# Patient Record
Sex: FEMALE | Race: WHITE | NOT HISPANIC OR LATINO | URBAN - METROPOLITAN AREA
[De-identification: names, ages, dates, MRNs, and addresses within clinical notes are randomized per-mention and may not be internally consistent; named-entity substitution may affect disease eponyms.]

---

## 2017-01-01 ENCOUNTER — OUTPATIENT (OUTPATIENT)
Dept: OUTPATIENT SERVICES | Facility: HOSPITAL | Age: 62
LOS: 1 days | Discharge: HOME | End: 2017-01-01

## 2017-01-04 ENCOUNTER — APPOINTMENT (OUTPATIENT)
Dept: PSYCHIATRY | Facility: CLINIC | Age: 62
End: 2017-01-04

## 2017-01-11 ENCOUNTER — APPOINTMENT (OUTPATIENT)
Dept: PSYCHIATRY | Facility: CLINIC | Age: 62
End: 2017-01-11

## 2017-01-17 DIAGNOSIS — F34.1 DYSTHYMIC DISORDER: ICD-10-CM

## 2017-01-25 ENCOUNTER — APPOINTMENT (OUTPATIENT)
Dept: PSYCHIATRY | Facility: CLINIC | Age: 62
End: 2017-01-25

## 2017-02-01 ENCOUNTER — APPOINTMENT (OUTPATIENT)
Dept: PSYCHIATRY | Facility: CLINIC | Age: 62
End: 2017-02-01

## 2017-02-08 ENCOUNTER — APPOINTMENT (OUTPATIENT)
Dept: PSYCHIATRY | Facility: CLINIC | Age: 62
End: 2017-02-08

## 2017-02-10 ENCOUNTER — APPOINTMENT (OUTPATIENT)
Dept: PSYCHIATRY | Facility: CLINIC | Age: 62
End: 2017-02-10

## 2017-02-15 ENCOUNTER — APPOINTMENT (OUTPATIENT)
Dept: PSYCHIATRY | Facility: CLINIC | Age: 62
End: 2017-02-15

## 2017-02-22 ENCOUNTER — APPOINTMENT (OUTPATIENT)
Dept: PSYCHIATRY | Facility: CLINIC | Age: 62
End: 2017-02-22

## 2017-03-01 ENCOUNTER — APPOINTMENT (OUTPATIENT)
Dept: PSYCHIATRY | Facility: CLINIC | Age: 62
End: 2017-03-01

## 2017-03-08 ENCOUNTER — APPOINTMENT (OUTPATIENT)
Dept: PSYCHIATRY | Facility: CLINIC | Age: 62
End: 2017-03-08

## 2017-03-15 ENCOUNTER — APPOINTMENT (OUTPATIENT)
Dept: PSYCHIATRY | Facility: CLINIC | Age: 62
End: 2017-03-15

## 2017-03-22 ENCOUNTER — APPOINTMENT (OUTPATIENT)
Dept: PSYCHIATRY | Facility: CLINIC | Age: 62
End: 2017-03-22

## 2017-03-29 ENCOUNTER — APPOINTMENT (OUTPATIENT)
Dept: PSYCHIATRY | Facility: CLINIC | Age: 62
End: 2017-03-29

## 2017-04-03 ENCOUNTER — APPOINTMENT (OUTPATIENT)
Dept: PSYCHIATRY | Facility: CLINIC | Age: 62
End: 2017-04-03

## 2017-04-05 ENCOUNTER — APPOINTMENT (OUTPATIENT)
Dept: PSYCHIATRY | Facility: CLINIC | Age: 62
End: 2017-04-05

## 2017-04-12 ENCOUNTER — APPOINTMENT (OUTPATIENT)
Dept: PSYCHIATRY | Facility: CLINIC | Age: 62
End: 2017-04-12

## 2017-04-19 ENCOUNTER — APPOINTMENT (OUTPATIENT)
Dept: PSYCHIATRY | Facility: CLINIC | Age: 62
End: 2017-04-19

## 2017-04-26 ENCOUNTER — APPOINTMENT (OUTPATIENT)
Dept: PSYCHIATRY | Facility: CLINIC | Age: 62
End: 2017-04-26

## 2017-05-03 ENCOUNTER — APPOINTMENT (OUTPATIENT)
Dept: PSYCHIATRY | Facility: CLINIC | Age: 62
End: 2017-05-03

## 2017-05-10 ENCOUNTER — APPOINTMENT (OUTPATIENT)
Dept: PSYCHIATRY | Facility: CLINIC | Age: 62
End: 2017-05-10

## 2017-05-17 ENCOUNTER — APPOINTMENT (OUTPATIENT)
Dept: PSYCHIATRY | Facility: CLINIC | Age: 62
End: 2017-05-17

## 2017-05-24 ENCOUNTER — APPOINTMENT (OUTPATIENT)
Dept: PSYCHIATRY | Facility: CLINIC | Age: 62
End: 2017-05-24

## 2017-05-31 ENCOUNTER — APPOINTMENT (OUTPATIENT)
Dept: PSYCHIATRY | Facility: CLINIC | Age: 62
End: 2017-05-31

## 2017-06-07 ENCOUNTER — APPOINTMENT (OUTPATIENT)
Dept: PSYCHIATRY | Facility: CLINIC | Age: 62
End: 2017-06-07

## 2017-06-14 ENCOUNTER — APPOINTMENT (OUTPATIENT)
Dept: PSYCHIATRY | Facility: CLINIC | Age: 62
End: 2017-06-14

## 2017-06-21 ENCOUNTER — APPOINTMENT (OUTPATIENT)
Dept: PSYCHIATRY | Facility: CLINIC | Age: 62
End: 2017-06-21

## 2017-06-28 ENCOUNTER — APPOINTMENT (OUTPATIENT)
Dept: PSYCHIATRY | Facility: CLINIC | Age: 62
End: 2017-06-28

## 2017-07-01 ENCOUNTER — OUTPATIENT (OUTPATIENT)
Dept: OUTPATIENT SERVICES | Facility: HOSPITAL | Age: 62
LOS: 1 days | Discharge: HOME | End: 2017-07-01

## 2017-07-05 ENCOUNTER — APPOINTMENT (OUTPATIENT)
Dept: PSYCHIATRY | Facility: CLINIC | Age: 62
End: 2017-07-05

## 2017-07-07 DIAGNOSIS — F41.1 GENERALIZED ANXIETY DISORDER: ICD-10-CM

## 2017-07-12 ENCOUNTER — APPOINTMENT (OUTPATIENT)
Dept: PSYCHIATRY | Facility: CLINIC | Age: 62
End: 2017-07-12

## 2017-07-19 ENCOUNTER — APPOINTMENT (OUTPATIENT)
Dept: PSYCHIATRY | Facility: CLINIC | Age: 62
End: 2017-07-19

## 2017-07-26 ENCOUNTER — APPOINTMENT (OUTPATIENT)
Dept: PSYCHIATRY | Facility: CLINIC | Age: 62
End: 2017-07-26

## 2017-08-02 ENCOUNTER — APPOINTMENT (OUTPATIENT)
Dept: PSYCHIATRY | Facility: CLINIC | Age: 62
End: 2017-08-02
Payer: MEDICAID

## 2017-08-02 PROCEDURE — 90834 PSYTX W PT 45 MINUTES: CPT

## 2017-08-09 ENCOUNTER — APPOINTMENT (OUTPATIENT)
Dept: PSYCHIATRY | Facility: CLINIC | Age: 62
End: 2017-08-09
Payer: MEDICAID

## 2017-08-09 PROCEDURE — 90834 PSYTX W PT 45 MINUTES: CPT

## 2017-08-16 ENCOUNTER — APPOINTMENT (OUTPATIENT)
Dept: PSYCHIATRY | Facility: CLINIC | Age: 62
End: 2017-08-16

## 2017-08-16 ENCOUNTER — APPOINTMENT (OUTPATIENT)
Dept: PSYCHIATRY | Facility: CLINIC | Age: 62
End: 2017-08-16
Payer: MEDICAID

## 2017-08-16 PROCEDURE — 90834 PSYTX W PT 45 MINUTES: CPT

## 2017-08-23 ENCOUNTER — APPOINTMENT (OUTPATIENT)
Dept: PSYCHIATRY | Facility: CLINIC | Age: 62
End: 2017-08-23

## 2017-08-30 ENCOUNTER — APPOINTMENT (OUTPATIENT)
Dept: PSYCHIATRY | Facility: CLINIC | Age: 62
End: 2017-08-30
Payer: MEDICAID

## 2017-08-30 PROCEDURE — 90834 PSYTX W PT 45 MINUTES: CPT

## 2017-09-06 ENCOUNTER — APPOINTMENT (OUTPATIENT)
Dept: PSYCHIATRY | Facility: CLINIC | Age: 62
End: 2017-09-06
Payer: MEDICAID

## 2017-09-06 ENCOUNTER — APPOINTMENT (OUTPATIENT)
Dept: PSYCHIATRY | Facility: CLINIC | Age: 62
End: 2017-09-06

## 2017-09-06 PROCEDURE — 90834 PSYTX W PT 45 MINUTES: CPT

## 2017-09-13 ENCOUNTER — APPOINTMENT (OUTPATIENT)
Dept: PSYCHIATRY | Facility: CLINIC | Age: 62
End: 2017-09-13
Payer: MEDICAID

## 2017-09-13 ENCOUNTER — APPOINTMENT (OUTPATIENT)
Dept: PSYCHIATRY | Facility: CLINIC | Age: 62
End: 2017-09-13

## 2017-09-13 PROCEDURE — 90834 PSYTX W PT 45 MINUTES: CPT

## 2017-09-27 ENCOUNTER — APPOINTMENT (OUTPATIENT)
Dept: PSYCHIATRY | Facility: CLINIC | Age: 62
End: 2017-09-27
Payer: MEDICAID

## 2017-09-27 PROCEDURE — 90834 PSYTX W PT 45 MINUTES: CPT

## 2017-10-04 ENCOUNTER — APPOINTMENT (OUTPATIENT)
Dept: PSYCHIATRY | Facility: CLINIC | Age: 62
End: 2017-10-04
Payer: MEDICAID

## 2017-10-04 PROCEDURE — 90834 PSYTX W PT 45 MINUTES: CPT

## 2017-10-11 ENCOUNTER — APPOINTMENT (OUTPATIENT)
Dept: PSYCHIATRY | Facility: CLINIC | Age: 62
End: 2017-10-11
Payer: MEDICAID

## 2017-10-11 PROCEDURE — 90834 PSYTX W PT 45 MINUTES: CPT

## 2017-10-18 ENCOUNTER — APPOINTMENT (OUTPATIENT)
Dept: PSYCHIATRY | Facility: CLINIC | Age: 62
End: 2017-10-18
Payer: MEDICAID

## 2017-10-18 PROCEDURE — 90834 PSYTX W PT 45 MINUTES: CPT

## 2017-10-25 ENCOUNTER — APPOINTMENT (OUTPATIENT)
Dept: PSYCHIATRY | Facility: CLINIC | Age: 62
End: 2017-10-25
Payer: MEDICAID

## 2017-10-25 PROCEDURE — 90834 PSYTX W PT 45 MINUTES: CPT

## 2017-11-01 ENCOUNTER — APPOINTMENT (OUTPATIENT)
Dept: PSYCHIATRY | Facility: CLINIC | Age: 62
End: 2017-11-01
Payer: MEDICAID

## 2017-11-01 PROCEDURE — 90834 PSYTX W PT 45 MINUTES: CPT

## 2017-11-08 ENCOUNTER — APPOINTMENT (OUTPATIENT)
Dept: PSYCHIATRY | Facility: CLINIC | Age: 62
End: 2017-11-08
Payer: MEDICAID

## 2017-11-08 PROCEDURE — 90834 PSYTX W PT 45 MINUTES: CPT

## 2017-11-15 ENCOUNTER — APPOINTMENT (OUTPATIENT)
Dept: PSYCHIATRY | Facility: CLINIC | Age: 62
End: 2017-11-15
Payer: MEDICAID

## 2017-11-15 PROCEDURE — 90834 PSYTX W PT 45 MINUTES: CPT

## 2017-11-22 ENCOUNTER — APPOINTMENT (OUTPATIENT)
Dept: PSYCHIATRY | Facility: CLINIC | Age: 62
End: 2017-11-22

## 2017-11-29 ENCOUNTER — APPOINTMENT (OUTPATIENT)
Dept: PSYCHIATRY | Facility: CLINIC | Age: 62
End: 2017-11-29

## 2017-12-06 ENCOUNTER — APPOINTMENT (OUTPATIENT)
Dept: PSYCHIATRY | Facility: CLINIC | Age: 62
End: 2017-12-06
Payer: MEDICAID

## 2017-12-06 PROCEDURE — 90834 PSYTX W PT 45 MINUTES: CPT

## 2017-12-13 ENCOUNTER — APPOINTMENT (OUTPATIENT)
Dept: PSYCHIATRY | Facility: CLINIC | Age: 62
End: 2017-12-13
Payer: MEDICAID

## 2017-12-13 PROCEDURE — 90834 PSYTX W PT 45 MINUTES: CPT

## 2017-12-20 ENCOUNTER — APPOINTMENT (OUTPATIENT)
Dept: PSYCHIATRY | Facility: CLINIC | Age: 62
End: 2017-12-20
Payer: MEDICAID

## 2017-12-20 PROCEDURE — 90834 PSYTX W PT 45 MINUTES: CPT

## 2017-12-27 ENCOUNTER — APPOINTMENT (OUTPATIENT)
Dept: PSYCHIATRY | Facility: CLINIC | Age: 62
End: 2017-12-27

## 2018-01-01 ENCOUNTER — OUTPATIENT (OUTPATIENT)
Dept: OUTPATIENT SERVICES | Facility: HOSPITAL | Age: 63
LOS: 1 days | Discharge: HOME | End: 2018-01-01

## 2018-01-03 ENCOUNTER — APPOINTMENT (OUTPATIENT)
Dept: PSYCHIATRY | Facility: CLINIC | Age: 63
End: 2018-01-03
Payer: MEDICAID

## 2018-01-03 PROCEDURE — 90834 PSYTX W PT 45 MINUTES: CPT

## 2018-01-10 ENCOUNTER — APPOINTMENT (OUTPATIENT)
Dept: PSYCHIATRY | Facility: CLINIC | Age: 63
End: 2018-01-10
Payer: MEDICAID

## 2018-01-10 PROCEDURE — 90834 PSYTX W PT 45 MINUTES: CPT

## 2018-01-11 DIAGNOSIS — F41.1 GENERALIZED ANXIETY DISORDER: ICD-10-CM

## 2018-01-24 ENCOUNTER — APPOINTMENT (OUTPATIENT)
Dept: PSYCHIATRY | Facility: CLINIC | Age: 63
End: 2018-01-24

## 2018-01-31 ENCOUNTER — APPOINTMENT (OUTPATIENT)
Dept: PSYCHIATRY | Facility: CLINIC | Age: 63
End: 2018-01-31
Payer: MEDICAID

## 2018-01-31 PROCEDURE — 90834 PSYTX W PT 45 MINUTES: CPT

## 2018-02-07 ENCOUNTER — APPOINTMENT (OUTPATIENT)
Dept: PSYCHIATRY | Facility: CLINIC | Age: 63
End: 2018-02-07
Payer: MEDICAID

## 2018-02-07 PROCEDURE — 90834 PSYTX W PT 45 MINUTES: CPT

## 2018-02-14 ENCOUNTER — APPOINTMENT (OUTPATIENT)
Dept: PSYCHIATRY | Facility: CLINIC | Age: 63
End: 2018-02-14
Payer: MEDICAID

## 2018-02-14 PROCEDURE — 90834 PSYTX W PT 45 MINUTES: CPT

## 2018-02-21 ENCOUNTER — APPOINTMENT (OUTPATIENT)
Dept: PSYCHIATRY | Facility: CLINIC | Age: 63
End: 2018-02-21
Payer: MEDICAID

## 2018-02-21 PROCEDURE — 90834 PSYTX W PT 45 MINUTES: CPT

## 2018-02-28 ENCOUNTER — APPOINTMENT (OUTPATIENT)
Dept: PSYCHIATRY | Facility: CLINIC | Age: 63
End: 2018-02-28
Payer: MEDICAID

## 2018-02-28 PROCEDURE — 90834 PSYTX W PT 45 MINUTES: CPT

## 2018-03-09 ENCOUNTER — APPOINTMENT (OUTPATIENT)
Dept: PSYCHIATRY | Facility: CLINIC | Age: 63
End: 2018-03-09
Payer: MEDICAID

## 2018-03-09 PROCEDURE — 90834 PSYTX W PT 45 MINUTES: CPT

## 2018-03-14 ENCOUNTER — APPOINTMENT (OUTPATIENT)
Dept: PSYCHIATRY | Facility: CLINIC | Age: 63
End: 2018-03-14
Payer: MEDICAID

## 2018-03-14 PROCEDURE — 90834 PSYTX W PT 45 MINUTES: CPT

## 2018-03-21 ENCOUNTER — APPOINTMENT (OUTPATIENT)
Dept: PSYCHIATRY | Facility: CLINIC | Age: 63
End: 2018-03-21

## 2018-03-28 ENCOUNTER — APPOINTMENT (OUTPATIENT)
Dept: PSYCHIATRY | Facility: CLINIC | Age: 63
End: 2018-03-28

## 2018-04-04 ENCOUNTER — APPOINTMENT (OUTPATIENT)
Dept: PSYCHIATRY | Facility: CLINIC | Age: 63
End: 2018-04-04
Payer: MEDICAID

## 2018-04-04 PROCEDURE — 90834 PSYTX W PT 45 MINUTES: CPT

## 2018-04-11 ENCOUNTER — APPOINTMENT (OUTPATIENT)
Dept: PSYCHIATRY | Facility: CLINIC | Age: 63
End: 2018-04-11

## 2018-04-18 ENCOUNTER — APPOINTMENT (OUTPATIENT)
Dept: PSYCHIATRY | Facility: CLINIC | Age: 63
End: 2018-04-18
Payer: MEDICAID

## 2018-04-18 PROCEDURE — 90834 PSYTX W PT 45 MINUTES: CPT

## 2018-04-25 ENCOUNTER — APPOINTMENT (OUTPATIENT)
Dept: PSYCHIATRY | Facility: CLINIC | Age: 63
End: 2018-04-25
Payer: MEDICAID

## 2018-04-25 PROCEDURE — 90834 PSYTX W PT 45 MINUTES: CPT

## 2018-05-02 ENCOUNTER — APPOINTMENT (OUTPATIENT)
Dept: PSYCHIATRY | Facility: CLINIC | Age: 63
End: 2018-05-02

## 2018-05-09 ENCOUNTER — APPOINTMENT (OUTPATIENT)
Dept: PSYCHIATRY | Facility: CLINIC | Age: 63
End: 2018-05-09
Payer: MEDICAID

## 2018-05-09 PROCEDURE — 90834 PSYTX W PT 45 MINUTES: CPT

## 2018-05-16 ENCOUNTER — APPOINTMENT (OUTPATIENT)
Dept: PSYCHIATRY | Facility: CLINIC | Age: 63
End: 2018-05-16

## 2018-05-23 ENCOUNTER — APPOINTMENT (OUTPATIENT)
Dept: PSYCHIATRY | Facility: CLINIC | Age: 63
End: 2018-05-23

## 2018-05-30 ENCOUNTER — APPOINTMENT (OUTPATIENT)
Dept: PSYCHIATRY | Facility: CLINIC | Age: 63
End: 2018-05-30

## 2018-06-06 ENCOUNTER — APPOINTMENT (OUTPATIENT)
Dept: PSYCHIATRY | Facility: CLINIC | Age: 63
End: 2018-06-06

## 2018-06-13 ENCOUNTER — APPOINTMENT (OUTPATIENT)
Dept: PSYCHIATRY | Facility: CLINIC | Age: 63
End: 2018-06-13
Payer: MEDICAID

## 2018-06-13 PROCEDURE — 90834 PSYTX W PT 45 MINUTES: CPT

## 2018-06-18 ENCOUNTER — APPOINTMENT (OUTPATIENT)
Dept: PSYCHIATRY | Facility: CLINIC | Age: 63
End: 2018-06-18
Payer: MEDICAID

## 2018-06-18 PROCEDURE — 90834 PSYTX W PT 45 MINUTES: CPT

## 2018-06-20 ENCOUNTER — APPOINTMENT (OUTPATIENT)
Dept: PSYCHIATRY | Facility: CLINIC | Age: 63
End: 2018-06-20

## 2018-06-27 ENCOUNTER — APPOINTMENT (OUTPATIENT)
Dept: PSYCHIATRY | Facility: CLINIC | Age: 63
End: 2018-06-27
Payer: MEDICAID

## 2018-06-27 PROCEDURE — 90834 PSYTX W PT 45 MINUTES: CPT

## 2018-07-11 ENCOUNTER — APPOINTMENT (OUTPATIENT)
Dept: PSYCHIATRY | Facility: CLINIC | Age: 63
End: 2018-07-11
Payer: MEDICAID

## 2018-07-11 PROCEDURE — 90834 PSYTX W PT 45 MINUTES: CPT

## 2018-07-18 ENCOUNTER — APPOINTMENT (OUTPATIENT)
Dept: PSYCHIATRY | Facility: CLINIC | Age: 63
End: 2018-07-18

## 2018-07-25 ENCOUNTER — APPOINTMENT (OUTPATIENT)
Dept: PSYCHIATRY | Facility: CLINIC | Age: 63
End: 2018-07-25
Payer: MEDICAID

## 2018-07-25 PROCEDURE — 90834 PSYTX W PT 45 MINUTES: CPT

## 2018-08-01 ENCOUNTER — APPOINTMENT (OUTPATIENT)
Dept: PSYCHIATRY | Facility: CLINIC | Age: 63
End: 2018-08-01

## 2018-08-08 ENCOUNTER — APPOINTMENT (OUTPATIENT)
Dept: PSYCHIATRY | Facility: CLINIC | Age: 63
End: 2018-08-08
Payer: MEDICAID

## 2018-08-08 PROCEDURE — 90834 PSYTX W PT 45 MINUTES: CPT

## 2018-08-15 ENCOUNTER — APPOINTMENT (OUTPATIENT)
Dept: PSYCHIATRY | Facility: CLINIC | Age: 63
End: 2018-08-15

## 2018-08-22 ENCOUNTER — APPOINTMENT (OUTPATIENT)
Dept: PSYCHIATRY | Facility: CLINIC | Age: 63
End: 2018-08-22

## 2018-08-29 ENCOUNTER — APPOINTMENT (OUTPATIENT)
Dept: PSYCHIATRY | Facility: CLINIC | Age: 63
End: 2018-08-29
Payer: MEDICAID

## 2018-08-29 PROCEDURE — 90834 PSYTX W PT 45 MINUTES: CPT

## 2018-09-05 ENCOUNTER — APPOINTMENT (OUTPATIENT)
Dept: PSYCHIATRY | Facility: CLINIC | Age: 63
End: 2018-09-05
Payer: MEDICAID

## 2018-09-05 PROCEDURE — 90834 PSYTX W PT 45 MINUTES: CPT

## 2018-09-12 ENCOUNTER — APPOINTMENT (OUTPATIENT)
Dept: PSYCHIATRY | Facility: CLINIC | Age: 63
End: 2018-09-12

## 2018-09-19 ENCOUNTER — APPOINTMENT (OUTPATIENT)
Dept: PSYCHIATRY | Facility: CLINIC | Age: 63
End: 2018-09-19

## 2018-09-26 ENCOUNTER — APPOINTMENT (OUTPATIENT)
Dept: PSYCHIATRY | Facility: CLINIC | Age: 63
End: 2018-09-26
Payer: MEDICAID

## 2018-09-26 PROCEDURE — 90834 PSYTX W PT 45 MINUTES: CPT

## 2018-10-03 ENCOUNTER — APPOINTMENT (OUTPATIENT)
Dept: PSYCHIATRY | Facility: CLINIC | Age: 63
End: 2018-10-03
Payer: MEDICAID

## 2018-10-03 PROCEDURE — 90834 PSYTX W PT 45 MINUTES: CPT

## 2018-10-10 ENCOUNTER — APPOINTMENT (OUTPATIENT)
Dept: PSYCHIATRY | Facility: CLINIC | Age: 63
End: 2018-10-10
Payer: MEDICAID

## 2018-10-10 PROCEDURE — 90834 PSYTX W PT 45 MINUTES: CPT

## 2018-10-16 ENCOUNTER — APPOINTMENT (OUTPATIENT)
Dept: PSYCHIATRY | Facility: CLINIC | Age: 63
End: 2018-10-16

## 2018-10-17 ENCOUNTER — APPOINTMENT (OUTPATIENT)
Dept: PSYCHIATRY | Facility: CLINIC | Age: 63
End: 2018-10-17

## 2018-10-22 ENCOUNTER — APPOINTMENT (OUTPATIENT)
Dept: PSYCHIATRY | Facility: CLINIC | Age: 63
End: 2018-10-22

## 2018-10-24 ENCOUNTER — APPOINTMENT (OUTPATIENT)
Dept: PSYCHIATRY | Facility: CLINIC | Age: 63
End: 2018-10-24

## 2018-10-31 ENCOUNTER — APPOINTMENT (OUTPATIENT)
Dept: PSYCHIATRY | Facility: CLINIC | Age: 63
End: 2018-10-31

## 2018-11-05 ENCOUNTER — APPOINTMENT (OUTPATIENT)
Dept: PSYCHIATRY | Facility: CLINIC | Age: 63
End: 2018-11-05
Payer: MEDICAID

## 2018-11-05 PROCEDURE — 90834 PSYTX W PT 45 MINUTES: CPT

## 2018-11-07 ENCOUNTER — APPOINTMENT (OUTPATIENT)
Dept: PSYCHIATRY | Facility: CLINIC | Age: 63
End: 2018-11-07

## 2018-11-13 ENCOUNTER — APPOINTMENT (OUTPATIENT)
Dept: PSYCHIATRY | Facility: CLINIC | Age: 63
End: 2018-11-13

## 2018-11-14 ENCOUNTER — APPOINTMENT (OUTPATIENT)
Dept: PSYCHIATRY | Facility: CLINIC | Age: 63
End: 2018-11-14

## 2018-11-19 ENCOUNTER — APPOINTMENT (OUTPATIENT)
Dept: PSYCHIATRY | Facility: CLINIC | Age: 63
End: 2018-11-19
Payer: MEDICAID

## 2018-11-19 PROCEDURE — 90834 PSYTX W PT 45 MINUTES: CPT

## 2018-11-21 ENCOUNTER — APPOINTMENT (OUTPATIENT)
Dept: PSYCHIATRY | Facility: CLINIC | Age: 63
End: 2018-11-21

## 2018-11-28 ENCOUNTER — APPOINTMENT (OUTPATIENT)
Dept: PSYCHIATRY | Facility: CLINIC | Age: 63
End: 2018-11-28

## 2018-12-03 ENCOUNTER — APPOINTMENT (OUTPATIENT)
Dept: PSYCHIATRY | Facility: CLINIC | Age: 63
End: 2018-12-03
Payer: MEDICAID

## 2018-12-03 PROCEDURE — 90834 PSYTX W PT 45 MINUTES: CPT

## 2018-12-05 ENCOUNTER — APPOINTMENT (OUTPATIENT)
Dept: PSYCHIATRY | Facility: CLINIC | Age: 63
End: 2018-12-05

## 2018-12-11 ENCOUNTER — APPOINTMENT (OUTPATIENT)
Dept: PSYCHIATRY | Facility: CLINIC | Age: 63
End: 2018-12-11

## 2018-12-12 ENCOUNTER — APPOINTMENT (OUTPATIENT)
Dept: PSYCHIATRY | Facility: CLINIC | Age: 63
End: 2018-12-12

## 2018-12-17 ENCOUNTER — APPOINTMENT (OUTPATIENT)
Dept: PSYCHIATRY | Facility: CLINIC | Age: 63
End: 2018-12-17
Payer: MEDICAID

## 2018-12-17 PROCEDURE — 90834 PSYTX W PT 45 MINUTES: CPT

## 2018-12-19 ENCOUNTER — APPOINTMENT (OUTPATIENT)
Dept: PSYCHIATRY | Facility: CLINIC | Age: 63
End: 2018-12-19

## 2018-12-26 ENCOUNTER — APPOINTMENT (OUTPATIENT)
Dept: PSYCHIATRY | Facility: CLINIC | Age: 63
End: 2018-12-26

## 2018-12-31 ENCOUNTER — APPOINTMENT (OUTPATIENT)
Dept: PSYCHIATRY | Facility: CLINIC | Age: 63
End: 2018-12-31

## 2019-01-14 ENCOUNTER — APPOINTMENT (OUTPATIENT)
Dept: PSYCHIATRY | Facility: CLINIC | Age: 64
End: 2019-01-14
Payer: MEDICAID

## 2019-01-14 PROCEDURE — 90834 PSYTX W PT 45 MINUTES: CPT

## 2019-01-28 ENCOUNTER — APPOINTMENT (OUTPATIENT)
Dept: PSYCHIATRY | Facility: CLINIC | Age: 64
End: 2019-01-28
Payer: MEDICAID

## 2019-01-28 PROCEDURE — 90834 PSYTX W PT 45 MINUTES: CPT

## 2019-02-04 ENCOUNTER — APPOINTMENT (OUTPATIENT)
Dept: PSYCHIATRY | Facility: CLINIC | Age: 64
End: 2019-02-04

## 2019-02-05 ENCOUNTER — APPOINTMENT (OUTPATIENT)
Dept: PSYCHIATRY | Facility: CLINIC | Age: 64
End: 2019-02-05

## 2019-02-27 ENCOUNTER — APPOINTMENT (OUTPATIENT)
Dept: PSYCHIATRY | Facility: CLINIC | Age: 64
End: 2019-02-27
Payer: MEDICAID

## 2019-02-27 PROCEDURE — 90834 PSYTX W PT 45 MINUTES: CPT

## 2019-03-06 ENCOUNTER — APPOINTMENT (OUTPATIENT)
Dept: PSYCHIATRY | Facility: CLINIC | Age: 64
End: 2019-03-06
Payer: MEDICAID

## 2019-03-06 PROCEDURE — 90834 PSYTX W PT 45 MINUTES: CPT

## 2019-03-11 ENCOUNTER — APPOINTMENT (OUTPATIENT)
Dept: PSYCHIATRY | Facility: CLINIC | Age: 64
End: 2019-03-11

## 2019-03-13 ENCOUNTER — APPOINTMENT (OUTPATIENT)
Dept: PSYCHIATRY | Facility: CLINIC | Age: 64
End: 2019-03-13
Payer: MEDICAID

## 2019-03-13 PROCEDURE — 90834 PSYTX W PT 45 MINUTES: CPT

## 2019-03-20 ENCOUNTER — APPOINTMENT (OUTPATIENT)
Dept: PSYCHIATRY | Facility: CLINIC | Age: 64
End: 2019-03-20
Payer: MEDICAID

## 2019-03-20 PROCEDURE — 90834 PSYTX W PT 45 MINUTES: CPT

## 2019-03-27 ENCOUNTER — APPOINTMENT (OUTPATIENT)
Dept: PSYCHIATRY | Facility: CLINIC | Age: 64
End: 2019-03-27
Payer: MEDICAID

## 2019-03-27 PROCEDURE — 90834 PSYTX W PT 45 MINUTES: CPT

## 2019-04-02 ENCOUNTER — APPOINTMENT (OUTPATIENT)
Dept: PSYCHIATRY | Facility: CLINIC | Age: 64
End: 2019-04-02

## 2019-04-03 ENCOUNTER — APPOINTMENT (OUTPATIENT)
Dept: PSYCHIATRY | Facility: CLINIC | Age: 64
End: 2019-04-03

## 2019-04-10 ENCOUNTER — APPOINTMENT (OUTPATIENT)
Dept: PSYCHIATRY | Facility: CLINIC | Age: 64
End: 2019-04-10
Payer: MEDICAID

## 2019-04-10 PROCEDURE — 90834 PSYTX W PT 45 MINUTES: CPT

## 2019-04-17 ENCOUNTER — APPOINTMENT (OUTPATIENT)
Dept: PSYCHIATRY | Facility: CLINIC | Age: 64
End: 2019-04-17

## 2019-04-24 ENCOUNTER — APPOINTMENT (OUTPATIENT)
Dept: PSYCHIATRY | Facility: CLINIC | Age: 64
End: 2019-04-24
Payer: MEDICAID

## 2019-04-24 PROCEDURE — 90834 PSYTX W PT 45 MINUTES: CPT

## 2019-05-01 ENCOUNTER — APPOINTMENT (OUTPATIENT)
Dept: PSYCHIATRY | Facility: CLINIC | Age: 64
End: 2019-05-01
Payer: MEDICAID

## 2019-05-01 PROCEDURE — 90834 PSYTX W PT 45 MINUTES: CPT

## 2019-05-08 ENCOUNTER — APPOINTMENT (OUTPATIENT)
Dept: PSYCHIATRY | Facility: CLINIC | Age: 64
End: 2019-05-08
Payer: MEDICAID

## 2019-05-08 PROCEDURE — 90834 PSYTX W PT 45 MINUTES: CPT

## 2019-05-15 ENCOUNTER — APPOINTMENT (OUTPATIENT)
Dept: PSYCHIATRY | Facility: CLINIC | Age: 64
End: 2019-05-15
Payer: MEDICAID

## 2019-05-15 PROCEDURE — 90834 PSYTX W PT 45 MINUTES: CPT

## 2019-05-22 ENCOUNTER — APPOINTMENT (OUTPATIENT)
Dept: PSYCHIATRY | Facility: CLINIC | Age: 64
End: 2019-05-22

## 2019-05-29 ENCOUNTER — APPOINTMENT (OUTPATIENT)
Dept: PSYCHIATRY | Facility: CLINIC | Age: 64
End: 2019-05-29
Payer: MEDICAID

## 2019-05-29 PROCEDURE — 90834 PSYTX W PT 45 MINUTES: CPT

## 2019-06-05 ENCOUNTER — APPOINTMENT (OUTPATIENT)
Dept: PSYCHIATRY | Facility: CLINIC | Age: 64
End: 2019-06-05

## 2019-06-12 ENCOUNTER — APPOINTMENT (OUTPATIENT)
Dept: PSYCHIATRY | Facility: CLINIC | Age: 64
End: 2019-06-12
Payer: MEDICAID

## 2019-06-12 PROCEDURE — 90834 PSYTX W PT 45 MINUTES: CPT

## 2019-06-18 ENCOUNTER — APPOINTMENT (OUTPATIENT)
Dept: PSYCHIATRY | Facility: CLINIC | Age: 64
End: 2019-06-18

## 2019-06-19 ENCOUNTER — APPOINTMENT (OUTPATIENT)
Dept: PSYCHIATRY | Facility: CLINIC | Age: 64
End: 2019-06-19
Payer: MEDICAID

## 2019-06-19 PROCEDURE — 90834 PSYTX W PT 45 MINUTES: CPT

## 2019-06-26 ENCOUNTER — APPOINTMENT (OUTPATIENT)
Dept: PSYCHIATRY | Facility: CLINIC | Age: 64
End: 2019-06-26
Payer: MEDICAID

## 2019-06-26 PROCEDURE — 90834 PSYTX W PT 45 MINUTES: CPT

## 2019-07-03 ENCOUNTER — APPOINTMENT (OUTPATIENT)
Dept: PSYCHIATRY | Facility: CLINIC | Age: 64
End: 2019-07-03

## 2019-07-10 ENCOUNTER — APPOINTMENT (OUTPATIENT)
Dept: PSYCHIATRY | Facility: CLINIC | Age: 64
End: 2019-07-10
Payer: MEDICAID

## 2019-07-10 PROCEDURE — 90834 PSYTX W PT 45 MINUTES: CPT

## 2019-07-17 ENCOUNTER — APPOINTMENT (OUTPATIENT)
Dept: PSYCHIATRY | Facility: CLINIC | Age: 64
End: 2019-07-17
Payer: MEDICAID

## 2019-07-17 PROCEDURE — 90834 PSYTX W PT 45 MINUTES: CPT

## 2019-07-24 ENCOUNTER — APPOINTMENT (OUTPATIENT)
Dept: PSYCHIATRY | Facility: CLINIC | Age: 64
End: 2019-07-24

## 2019-07-31 ENCOUNTER — APPOINTMENT (OUTPATIENT)
Dept: PSYCHIATRY | Facility: CLINIC | Age: 64
End: 2019-07-31
Payer: MEDICAID

## 2019-07-31 PROCEDURE — 90834 PSYTX W PT 45 MINUTES: CPT

## 2019-08-07 ENCOUNTER — APPOINTMENT (OUTPATIENT)
Dept: PSYCHIATRY | Facility: CLINIC | Age: 64
End: 2019-08-07
Payer: MEDICAID

## 2019-08-07 PROCEDURE — 90834 PSYTX W PT 45 MINUTES: CPT

## 2019-08-14 ENCOUNTER — APPOINTMENT (OUTPATIENT)
Dept: PSYCHIATRY | Facility: CLINIC | Age: 64
End: 2019-08-14
Payer: MEDICAID

## 2019-08-14 PROCEDURE — 90834 PSYTX W PT 45 MINUTES: CPT

## 2019-08-21 ENCOUNTER — APPOINTMENT (OUTPATIENT)
Dept: PSYCHIATRY | Facility: CLINIC | Age: 64
End: 2019-08-21
Payer: MEDICAID

## 2019-08-21 PROCEDURE — 90834 PSYTX W PT 45 MINUTES: CPT

## 2019-08-28 ENCOUNTER — APPOINTMENT (OUTPATIENT)
Dept: PSYCHIATRY | Facility: CLINIC | Age: 64
End: 2019-08-28

## 2019-09-04 ENCOUNTER — APPOINTMENT (OUTPATIENT)
Dept: PSYCHIATRY | Facility: CLINIC | Age: 64
End: 2019-09-04

## 2019-09-11 ENCOUNTER — APPOINTMENT (OUTPATIENT)
Dept: PSYCHIATRY | Facility: CLINIC | Age: 64
End: 2019-09-11

## 2019-09-17 ENCOUNTER — APPOINTMENT (OUTPATIENT)
Dept: PSYCHIATRY | Facility: CLINIC | Age: 64
End: 2019-09-17

## 2019-09-18 ENCOUNTER — APPOINTMENT (OUTPATIENT)
Dept: PSYCHIATRY | Facility: CLINIC | Age: 64
End: 2019-09-18
Payer: MEDICAID

## 2019-09-18 PROCEDURE — 90834 PSYTX W PT 45 MINUTES: CPT

## 2019-09-24 ENCOUNTER — APPOINTMENT (OUTPATIENT)
Dept: PSYCHIATRY | Facility: CLINIC | Age: 64
End: 2019-09-24

## 2019-09-25 ENCOUNTER — APPOINTMENT (OUTPATIENT)
Dept: PSYCHIATRY | Facility: CLINIC | Age: 64
End: 2019-09-25

## 2019-10-01 ENCOUNTER — APPOINTMENT (OUTPATIENT)
Dept: PSYCHIATRY | Facility: CLINIC | Age: 64
End: 2019-10-01

## 2019-10-02 ENCOUNTER — APPOINTMENT (OUTPATIENT)
Dept: PSYCHIATRY | Facility: CLINIC | Age: 64
End: 2019-10-02

## 2019-10-08 ENCOUNTER — APPOINTMENT (OUTPATIENT)
Dept: PSYCHIATRY | Facility: CLINIC | Age: 64
End: 2019-10-08

## 2019-10-09 ENCOUNTER — APPOINTMENT (OUTPATIENT)
Dept: PSYCHIATRY | Facility: CLINIC | Age: 64
End: 2019-10-09

## 2019-10-16 ENCOUNTER — APPOINTMENT (OUTPATIENT)
Dept: PSYCHIATRY | Facility: CLINIC | Age: 64
End: 2019-10-16

## 2019-10-23 ENCOUNTER — APPOINTMENT (OUTPATIENT)
Dept: PSYCHIATRY | Facility: CLINIC | Age: 64
End: 2019-10-23

## 2019-10-30 ENCOUNTER — APPOINTMENT (OUTPATIENT)
Dept: PSYCHIATRY | Facility: CLINIC | Age: 64
End: 2019-10-30

## 2019-11-06 ENCOUNTER — APPOINTMENT (OUTPATIENT)
Dept: PSYCHIATRY | Facility: CLINIC | Age: 64
End: 2019-11-06

## 2019-11-12 ENCOUNTER — APPOINTMENT (OUTPATIENT)
Dept: PSYCHIATRY | Facility: CLINIC | Age: 64
End: 2019-11-12

## 2019-11-13 ENCOUNTER — APPOINTMENT (OUTPATIENT)
Dept: PSYCHIATRY | Facility: CLINIC | Age: 64
End: 2019-11-13

## 2019-11-20 ENCOUNTER — APPOINTMENT (OUTPATIENT)
Dept: PSYCHIATRY | Facility: CLINIC | Age: 64
End: 2019-11-20

## 2019-11-27 ENCOUNTER — APPOINTMENT (OUTPATIENT)
Dept: PSYCHIATRY | Facility: CLINIC | Age: 64
End: 2019-11-27

## 2019-12-02 ENCOUNTER — APPOINTMENT (OUTPATIENT)
Dept: PSYCHIATRY | Facility: CLINIC | Age: 64
End: 2019-12-02

## 2019-12-03 ENCOUNTER — APPOINTMENT (OUTPATIENT)
Dept: PSYCHIATRY | Facility: CLINIC | Age: 64
End: 2019-12-03

## 2019-12-04 ENCOUNTER — APPOINTMENT (OUTPATIENT)
Dept: PSYCHIATRY | Facility: CLINIC | Age: 64
End: 2019-12-04

## 2019-12-11 ENCOUNTER — APPOINTMENT (OUTPATIENT)
Dept: PSYCHIATRY | Facility: CLINIC | Age: 64
End: 2019-12-11

## 2019-12-18 ENCOUNTER — APPOINTMENT (OUTPATIENT)
Dept: PSYCHIATRY | Facility: CLINIC | Age: 64
End: 2019-12-18

## 2020-01-08 ENCOUNTER — APPOINTMENT (OUTPATIENT)
Dept: PSYCHIATRY | Facility: CLINIC | Age: 65
End: 2020-01-08

## 2020-01-15 ENCOUNTER — APPOINTMENT (OUTPATIENT)
Dept: PSYCHIATRY | Facility: CLINIC | Age: 65
End: 2020-01-15

## 2020-01-22 ENCOUNTER — APPOINTMENT (OUTPATIENT)
Dept: PSYCHIATRY | Facility: CLINIC | Age: 65
End: 2020-01-22

## 2020-01-29 ENCOUNTER — APPOINTMENT (OUTPATIENT)
Dept: PSYCHIATRY | Facility: CLINIC | Age: 65
End: 2020-01-29

## 2020-02-04 ENCOUNTER — APPOINTMENT (OUTPATIENT)
Dept: PSYCHIATRY | Facility: CLINIC | Age: 65
End: 2020-02-04

## 2020-02-05 ENCOUNTER — APPOINTMENT (OUTPATIENT)
Dept: PSYCHIATRY | Facility: CLINIC | Age: 65
End: 2020-02-05

## 2020-02-12 ENCOUNTER — APPOINTMENT (OUTPATIENT)
Dept: PSYCHIATRY | Facility: CLINIC | Age: 65
End: 2020-02-12

## 2020-02-19 ENCOUNTER — APPOINTMENT (OUTPATIENT)
Dept: PSYCHIATRY | Facility: CLINIC | Age: 65
End: 2020-02-19

## 2020-02-25 ENCOUNTER — FORM ENCOUNTER (OUTPATIENT)
Age: 65
End: 2020-02-25

## 2020-02-26 ENCOUNTER — APPOINTMENT (OUTPATIENT)
Dept: PSYCHIATRY | Facility: CLINIC | Age: 65
End: 2020-02-26

## 2020-03-03 ENCOUNTER — FORM ENCOUNTER (OUTPATIENT)
Age: 65
End: 2020-03-03

## 2020-03-04 ENCOUNTER — APPOINTMENT (OUTPATIENT)
Dept: PSYCHIATRY | Facility: CLINIC | Age: 65
End: 2020-03-04

## 2020-03-10 ENCOUNTER — FORM ENCOUNTER (OUTPATIENT)
Age: 65
End: 2020-03-10

## 2020-03-11 ENCOUNTER — APPOINTMENT (OUTPATIENT)
Dept: PSYCHIATRY | Facility: CLINIC | Age: 65
End: 2020-03-11

## 2020-03-17 ENCOUNTER — FORM ENCOUNTER (OUTPATIENT)
Age: 65
End: 2020-03-17

## 2020-03-18 ENCOUNTER — APPOINTMENT (OUTPATIENT)
Dept: PSYCHIATRY | Facility: CLINIC | Age: 65
End: 2020-03-18

## 2020-03-24 ENCOUNTER — FORM ENCOUNTER (OUTPATIENT)
Age: 65
End: 2020-03-24

## 2020-03-25 ENCOUNTER — APPOINTMENT (OUTPATIENT)
Dept: PSYCHIATRY | Facility: CLINIC | Age: 65
End: 2020-03-25

## 2020-03-31 ENCOUNTER — FORM ENCOUNTER (OUTPATIENT)
Age: 65
End: 2020-03-31

## 2020-04-01 ENCOUNTER — APPOINTMENT (OUTPATIENT)
Dept: PSYCHIATRY | Facility: CLINIC | Age: 65
End: 2020-04-01

## 2020-04-07 ENCOUNTER — FORM ENCOUNTER (OUTPATIENT)
Age: 65
End: 2020-04-07

## 2020-04-08 ENCOUNTER — APPOINTMENT (OUTPATIENT)
Dept: PSYCHIATRY | Facility: CLINIC | Age: 65
End: 2020-04-08

## 2020-04-14 ENCOUNTER — FORM ENCOUNTER (OUTPATIENT)
Age: 65
End: 2020-04-14

## 2020-04-15 ENCOUNTER — APPOINTMENT (OUTPATIENT)
Dept: PSYCHIATRY | Facility: CLINIC | Age: 65
End: 2020-04-15

## 2020-04-20 ENCOUNTER — FORM ENCOUNTER (OUTPATIENT)
Age: 65
End: 2020-04-20

## 2020-04-21 ENCOUNTER — APPOINTMENT (OUTPATIENT)
Dept: PSYCHIATRY | Facility: CLINIC | Age: 65
End: 2020-04-21

## 2020-04-21 ENCOUNTER — FORM ENCOUNTER (OUTPATIENT)
Age: 65
End: 2020-04-21

## 2020-04-22 ENCOUNTER — APPOINTMENT (OUTPATIENT)
Dept: PSYCHIATRY | Facility: CLINIC | Age: 65
End: 2020-04-22

## 2020-04-28 ENCOUNTER — FORM ENCOUNTER (OUTPATIENT)
Age: 65
End: 2020-04-28

## 2020-04-29 ENCOUNTER — APPOINTMENT (OUTPATIENT)
Dept: PSYCHIATRY | Facility: CLINIC | Age: 65
End: 2020-04-29

## 2020-05-05 ENCOUNTER — FORM ENCOUNTER (OUTPATIENT)
Age: 65
End: 2020-05-05

## 2020-05-06 ENCOUNTER — APPOINTMENT (OUTPATIENT)
Dept: PSYCHIATRY | Facility: CLINIC | Age: 65
End: 2020-05-06

## 2020-05-12 ENCOUNTER — FORM ENCOUNTER (OUTPATIENT)
Age: 65
End: 2020-05-12

## 2020-05-13 ENCOUNTER — APPOINTMENT (OUTPATIENT)
Dept: PSYCHIATRY | Facility: CLINIC | Age: 65
End: 2020-05-13

## 2020-05-19 ENCOUNTER — FORM ENCOUNTER (OUTPATIENT)
Age: 65
End: 2020-05-19

## 2020-05-20 ENCOUNTER — APPOINTMENT (OUTPATIENT)
Dept: PSYCHIATRY | Facility: CLINIC | Age: 65
End: 2020-05-20

## 2020-05-26 ENCOUNTER — FORM ENCOUNTER (OUTPATIENT)
Age: 65
End: 2020-05-26

## 2020-05-27 ENCOUNTER — APPOINTMENT (OUTPATIENT)
Dept: PSYCHIATRY | Facility: CLINIC | Age: 65
End: 2020-05-27

## 2020-06-01 ENCOUNTER — FORM ENCOUNTER (OUTPATIENT)
Age: 65
End: 2020-06-01

## 2020-06-02 ENCOUNTER — FORM ENCOUNTER (OUTPATIENT)
Age: 65
End: 2020-06-02

## 2020-06-03 ENCOUNTER — APPOINTMENT (OUTPATIENT)
Dept: PSYCHIATRY | Facility: CLINIC | Age: 65
End: 2020-06-03

## 2020-06-09 ENCOUNTER — FORM ENCOUNTER (OUTPATIENT)
Age: 65
End: 2020-06-09

## 2020-06-10 ENCOUNTER — APPOINTMENT (OUTPATIENT)
Dept: PSYCHIATRY | Facility: CLINIC | Age: 65
End: 2020-06-10

## 2020-06-16 ENCOUNTER — FORM ENCOUNTER (OUTPATIENT)
Age: 65
End: 2020-06-16

## 2020-06-17 ENCOUNTER — APPOINTMENT (OUTPATIENT)
Dept: PSYCHIATRY | Facility: CLINIC | Age: 65
End: 2020-06-17

## 2020-06-23 ENCOUNTER — FORM ENCOUNTER (OUTPATIENT)
Age: 65
End: 2020-06-23

## 2020-06-24 ENCOUNTER — APPOINTMENT (OUTPATIENT)
Dept: PSYCHIATRY | Facility: CLINIC | Age: 65
End: 2020-06-24

## 2020-07-01 ENCOUNTER — APPOINTMENT (OUTPATIENT)
Dept: PSYCHIATRY | Facility: CLINIC | Age: 65
End: 2020-07-01

## 2020-07-07 ENCOUNTER — FORM ENCOUNTER (OUTPATIENT)
Age: 65
End: 2020-07-07

## 2020-07-08 ENCOUNTER — APPOINTMENT (OUTPATIENT)
Dept: PSYCHIATRY | Facility: CLINIC | Age: 65
End: 2020-07-08

## 2020-07-14 ENCOUNTER — FORM ENCOUNTER (OUTPATIENT)
Age: 65
End: 2020-07-14

## 2020-07-15 ENCOUNTER — FORM ENCOUNTER (OUTPATIENT)
Age: 65
End: 2020-07-15

## 2020-07-15 ENCOUNTER — APPOINTMENT (OUTPATIENT)
Dept: PSYCHIATRY | Facility: CLINIC | Age: 65
End: 2020-07-15

## 2020-07-16 ENCOUNTER — APPOINTMENT (OUTPATIENT)
Dept: PSYCHIATRY | Facility: CLINIC | Age: 65
End: 2020-07-16

## 2020-07-22 ENCOUNTER — APPOINTMENT (OUTPATIENT)
Dept: PSYCHIATRY | Facility: CLINIC | Age: 65
End: 2020-07-22

## 2020-07-26 ENCOUNTER — FORM ENCOUNTER (OUTPATIENT)
Age: 65
End: 2020-07-26

## 2020-07-28 ENCOUNTER — FORM ENCOUNTER (OUTPATIENT)
Age: 65
End: 2020-07-28

## 2020-07-29 ENCOUNTER — APPOINTMENT (OUTPATIENT)
Dept: PSYCHIATRY | Facility: CLINIC | Age: 65
End: 2020-07-29

## 2020-08-04 ENCOUNTER — FORM ENCOUNTER (OUTPATIENT)
Age: 65
End: 2020-08-04

## 2020-08-05 ENCOUNTER — APPOINTMENT (OUTPATIENT)
Dept: PSYCHIATRY | Facility: CLINIC | Age: 65
End: 2020-08-05

## 2020-08-05 ENCOUNTER — FORM ENCOUNTER (OUTPATIENT)
Age: 65
End: 2020-08-05

## 2020-08-06 ENCOUNTER — APPOINTMENT (OUTPATIENT)
Dept: PSYCHIATRY | Facility: CLINIC | Age: 65
End: 2020-08-06

## 2020-08-11 ENCOUNTER — FORM ENCOUNTER (OUTPATIENT)
Age: 65
End: 2020-08-11

## 2020-08-12 ENCOUNTER — APPOINTMENT (OUTPATIENT)
Dept: PSYCHIATRY | Facility: CLINIC | Age: 65
End: 2020-08-12

## 2020-08-17 ENCOUNTER — FORM ENCOUNTER (OUTPATIENT)
Age: 65
End: 2020-08-17

## 2020-08-18 ENCOUNTER — FORM ENCOUNTER (OUTPATIENT)
Age: 65
End: 2020-08-18

## 2020-08-19 ENCOUNTER — APPOINTMENT (OUTPATIENT)
Dept: PSYCHIATRY | Facility: CLINIC | Age: 65
End: 2020-08-19

## 2020-08-26 ENCOUNTER — FORM ENCOUNTER (OUTPATIENT)
Age: 65
End: 2020-08-26

## 2020-08-26 ENCOUNTER — APPOINTMENT (OUTPATIENT)
Dept: PSYCHIATRY | Facility: CLINIC | Age: 65
End: 2020-08-26

## 2020-09-01 ENCOUNTER — FORM ENCOUNTER (OUTPATIENT)
Age: 65
End: 2020-09-01

## 2020-09-02 ENCOUNTER — APPOINTMENT (OUTPATIENT)
Dept: PSYCHIATRY | Facility: CLINIC | Age: 65
End: 2020-09-02

## 2020-09-02 ENCOUNTER — FORM ENCOUNTER (OUTPATIENT)
Age: 65
End: 2020-09-02

## 2020-09-03 ENCOUNTER — APPOINTMENT (OUTPATIENT)
Dept: PSYCHIATRY | Facility: CLINIC | Age: 65
End: 2020-09-03

## 2020-09-08 ENCOUNTER — FORM ENCOUNTER (OUTPATIENT)
Age: 65
End: 2020-09-08

## 2020-09-09 ENCOUNTER — APPOINTMENT (OUTPATIENT)
Dept: PSYCHIATRY | Facility: CLINIC | Age: 65
End: 2020-09-09

## 2020-09-15 ENCOUNTER — FORM ENCOUNTER (OUTPATIENT)
Age: 65
End: 2020-09-15

## 2020-09-16 ENCOUNTER — APPOINTMENT (OUTPATIENT)
Dept: PSYCHIATRY | Facility: CLINIC | Age: 65
End: 2020-09-16

## 2020-09-22 ENCOUNTER — FORM ENCOUNTER (OUTPATIENT)
Age: 65
End: 2020-09-22

## 2020-09-23 ENCOUNTER — APPOINTMENT (OUTPATIENT)
Dept: PSYCHIATRY | Facility: CLINIC | Age: 65
End: 2020-09-23

## 2020-09-29 ENCOUNTER — FORM ENCOUNTER (OUTPATIENT)
Age: 65
End: 2020-09-29

## 2020-09-30 ENCOUNTER — FORM ENCOUNTER (OUTPATIENT)
Age: 65
End: 2020-09-30

## 2020-09-30 ENCOUNTER — APPOINTMENT (OUTPATIENT)
Dept: PSYCHIATRY | Facility: CLINIC | Age: 65
End: 2020-09-30

## 2020-10-01 ENCOUNTER — APPOINTMENT (OUTPATIENT)
Dept: PSYCHIATRY | Facility: CLINIC | Age: 65
End: 2020-10-01

## 2020-10-06 ENCOUNTER — FORM ENCOUNTER (OUTPATIENT)
Age: 65
End: 2020-10-06

## 2020-10-07 ENCOUNTER — APPOINTMENT (OUTPATIENT)
Dept: PSYCHIATRY | Facility: CLINIC | Age: 65
End: 2020-10-07

## 2020-10-13 ENCOUNTER — FORM ENCOUNTER (OUTPATIENT)
Age: 65
End: 2020-10-13

## 2020-10-14 ENCOUNTER — APPOINTMENT (OUTPATIENT)
Dept: PSYCHIATRY | Facility: CLINIC | Age: 65
End: 2020-10-14

## 2020-10-20 ENCOUNTER — FORM ENCOUNTER (OUTPATIENT)
Age: 65
End: 2020-10-20

## 2020-10-21 ENCOUNTER — APPOINTMENT (OUTPATIENT)
Dept: PSYCHIATRY | Facility: CLINIC | Age: 65
End: 2020-10-21

## 2020-10-27 ENCOUNTER — FORM ENCOUNTER (OUTPATIENT)
Age: 65
End: 2020-10-27

## 2020-10-28 ENCOUNTER — APPOINTMENT (OUTPATIENT)
Dept: PSYCHIATRY | Facility: CLINIC | Age: 65
End: 2020-10-28

## 2020-11-03 ENCOUNTER — FORM ENCOUNTER (OUTPATIENT)
Age: 65
End: 2020-11-03

## 2020-11-04 ENCOUNTER — APPOINTMENT (OUTPATIENT)
Dept: PSYCHIATRY | Facility: CLINIC | Age: 65
End: 2020-11-04

## 2020-11-04 ENCOUNTER — FORM ENCOUNTER (OUTPATIENT)
Age: 65
End: 2020-11-04

## 2020-11-05 ENCOUNTER — APPOINTMENT (OUTPATIENT)
Dept: PSYCHIATRY | Facility: CLINIC | Age: 65
End: 2020-11-05

## 2020-11-10 ENCOUNTER — FORM ENCOUNTER (OUTPATIENT)
Age: 65
End: 2020-11-10

## 2020-11-11 ENCOUNTER — APPOINTMENT (OUTPATIENT)
Dept: PSYCHIATRY | Facility: CLINIC | Age: 65
End: 2020-11-11

## 2020-11-18 ENCOUNTER — APPOINTMENT (OUTPATIENT)
Dept: PSYCHIATRY | Facility: CLINIC | Age: 65
End: 2020-11-18

## 2020-11-19 ENCOUNTER — FORM ENCOUNTER (OUTPATIENT)
Age: 65
End: 2020-11-19

## 2020-11-20 ENCOUNTER — APPOINTMENT (OUTPATIENT)
Dept: PSYCHIATRY | Facility: CLINIC | Age: 65
End: 2020-11-20

## 2020-11-25 ENCOUNTER — APPOINTMENT (OUTPATIENT)
Dept: PSYCHIATRY | Facility: CLINIC | Age: 65
End: 2020-11-25

## 2020-11-27 ENCOUNTER — APPOINTMENT (OUTPATIENT)
Dept: PSYCHIATRY | Facility: CLINIC | Age: 65
End: 2020-11-27

## 2020-12-01 ENCOUNTER — FORM ENCOUNTER (OUTPATIENT)
Age: 65
End: 2020-12-01

## 2020-12-02 ENCOUNTER — APPOINTMENT (OUTPATIENT)
Dept: PSYCHIATRY | Facility: CLINIC | Age: 65
End: 2020-12-02

## 2020-12-07 ENCOUNTER — FORM ENCOUNTER (OUTPATIENT)
Age: 65
End: 2020-12-07

## 2020-12-09 ENCOUNTER — APPOINTMENT (OUTPATIENT)
Dept: PSYCHIATRY | Facility: CLINIC | Age: 65
End: 2020-12-09

## 2020-12-16 ENCOUNTER — APPOINTMENT (OUTPATIENT)
Dept: PSYCHIATRY | Facility: CLINIC | Age: 65
End: 2020-12-16

## 2020-12-16 ENCOUNTER — FORM ENCOUNTER (OUTPATIENT)
Age: 65
End: 2020-12-16

## 2020-12-17 ENCOUNTER — APPOINTMENT (OUTPATIENT)
Dept: PSYCHIATRY | Facility: CLINIC | Age: 65
End: 2020-12-17

## 2020-12-20 ENCOUNTER — FORM ENCOUNTER (OUTPATIENT)
Age: 65
End: 2020-12-20

## 2020-12-23 ENCOUNTER — APPOINTMENT (OUTPATIENT)
Dept: PSYCHIATRY | Facility: CLINIC | Age: 65
End: 2020-12-23

## 2020-12-30 ENCOUNTER — APPOINTMENT (OUTPATIENT)
Dept: PSYCHIATRY | Facility: CLINIC | Age: 65
End: 2020-12-30

## 2021-01-06 ENCOUNTER — APPOINTMENT (OUTPATIENT)
Dept: PSYCHIATRY | Facility: CLINIC | Age: 66
End: 2021-01-06

## 2021-01-08 ENCOUNTER — NON-APPOINTMENT (OUTPATIENT)
Age: 66
End: 2021-01-08

## 2021-01-12 ENCOUNTER — FORM ENCOUNTER (OUTPATIENT)
Age: 66
End: 2021-01-12

## 2021-01-13 ENCOUNTER — FORM ENCOUNTER (OUTPATIENT)
Age: 66
End: 2021-01-13

## 2021-01-13 ENCOUNTER — APPOINTMENT (OUTPATIENT)
Dept: PSYCHIATRY | Facility: CLINIC | Age: 66
End: 2021-01-13

## 2021-01-14 ENCOUNTER — APPOINTMENT (OUTPATIENT)
Dept: PSYCHIATRY | Facility: CLINIC | Age: 66
End: 2021-01-14

## 2021-01-15 ENCOUNTER — TRANSCRIPTION ENCOUNTER (OUTPATIENT)
Age: 66
End: 2021-01-15

## 2021-01-20 ENCOUNTER — APPOINTMENT (OUTPATIENT)
Dept: PSYCHIATRY | Facility: CLINIC | Age: 66
End: 2021-01-20

## 2021-01-27 ENCOUNTER — APPOINTMENT (OUTPATIENT)
Dept: PSYCHIATRY | Facility: CLINIC | Age: 66
End: 2021-01-27
Payer: MEDICAID

## 2021-01-27 ENCOUNTER — APPOINTMENT (OUTPATIENT)
Dept: PSYCHIATRY | Facility: CLINIC | Age: 66
End: 2021-01-27

## 2021-01-27 PROCEDURE — 90834 PSYTX W PT 45 MINUTES: CPT | Mod: 95

## 2021-02-01 ENCOUNTER — NON-APPOINTMENT (OUTPATIENT)
Age: 66
End: 2021-02-01

## 2021-02-03 ENCOUNTER — APPOINTMENT (OUTPATIENT)
Dept: PSYCHIATRY | Facility: CLINIC | Age: 66
End: 2021-02-03

## 2021-02-10 ENCOUNTER — APPOINTMENT (OUTPATIENT)
Dept: PSYCHIATRY | Facility: CLINIC | Age: 66
End: 2021-02-10

## 2021-02-10 ENCOUNTER — APPOINTMENT (OUTPATIENT)
Dept: PSYCHIATRY | Facility: CLINIC | Age: 66
End: 2021-02-10
Payer: MEDICAID

## 2021-02-10 PROCEDURE — 90834 PSYTX W PT 45 MINUTES: CPT | Mod: 95

## 2021-02-17 ENCOUNTER — APPOINTMENT (OUTPATIENT)
Dept: PSYCHIATRY | Facility: CLINIC | Age: 66
End: 2021-02-17

## 2021-02-24 ENCOUNTER — APPOINTMENT (OUTPATIENT)
Dept: PSYCHIATRY | Facility: CLINIC | Age: 66
End: 2021-02-24
Payer: MEDICAID

## 2021-02-24 ENCOUNTER — APPOINTMENT (OUTPATIENT)
Dept: PSYCHIATRY | Facility: CLINIC | Age: 66
End: 2021-02-24

## 2021-02-24 PROCEDURE — 90834 PSYTX W PT 45 MINUTES: CPT | Mod: 95

## 2021-02-25 ENCOUNTER — APPOINTMENT (OUTPATIENT)
Dept: PSYCHIATRY | Facility: CLINIC | Age: 66
End: 2021-02-25

## 2021-03-03 ENCOUNTER — APPOINTMENT (OUTPATIENT)
Dept: PSYCHIATRY | Facility: CLINIC | Age: 66
End: 2021-03-03

## 2021-03-10 ENCOUNTER — APPOINTMENT (OUTPATIENT)
Dept: PSYCHIATRY | Facility: CLINIC | Age: 66
End: 2021-03-10

## 2021-03-10 ENCOUNTER — APPOINTMENT (OUTPATIENT)
Dept: PSYCHIATRY | Facility: CLINIC | Age: 66
End: 2021-03-10
Payer: MEDICAID

## 2021-03-10 PROCEDURE — 90832 PSYTX W PT 30 MINUTES: CPT | Mod: 95

## 2021-03-17 ENCOUNTER — APPOINTMENT (OUTPATIENT)
Dept: PSYCHIATRY | Facility: CLINIC | Age: 66
End: 2021-03-17

## 2021-03-24 ENCOUNTER — APPOINTMENT (OUTPATIENT)
Dept: PSYCHIATRY | Facility: CLINIC | Age: 66
End: 2021-03-24

## 2021-03-24 ENCOUNTER — APPOINTMENT (OUTPATIENT)
Dept: PSYCHIATRY | Facility: CLINIC | Age: 66
End: 2021-03-24
Payer: MEDICAID

## 2021-03-24 PROCEDURE — 90834 PSYTX W PT 45 MINUTES: CPT | Mod: 95

## 2021-03-31 ENCOUNTER — APPOINTMENT (OUTPATIENT)
Dept: PSYCHIATRY | Facility: CLINIC | Age: 66
End: 2021-03-31

## 2021-04-01 ENCOUNTER — APPOINTMENT (OUTPATIENT)
Dept: PSYCHIATRY | Facility: CLINIC | Age: 66
End: 2021-04-01

## 2021-04-07 ENCOUNTER — APPOINTMENT (OUTPATIENT)
Dept: PSYCHIATRY | Facility: CLINIC | Age: 66
End: 2021-04-07
Payer: MEDICAID

## 2021-04-07 ENCOUNTER — APPOINTMENT (OUTPATIENT)
Dept: PSYCHIATRY | Facility: CLINIC | Age: 66
End: 2021-04-07

## 2021-04-07 PROCEDURE — 90832 PSYTX W PT 30 MINUTES: CPT | Mod: 95

## 2021-04-14 ENCOUNTER — APPOINTMENT (OUTPATIENT)
Dept: PSYCHIATRY | Facility: CLINIC | Age: 66
End: 2021-04-14

## 2021-04-21 ENCOUNTER — APPOINTMENT (OUTPATIENT)
Dept: PSYCHIATRY | Facility: CLINIC | Age: 66
End: 2021-04-21
Payer: MEDICAID

## 2021-04-21 ENCOUNTER — APPOINTMENT (OUTPATIENT)
Dept: PSYCHIATRY | Facility: CLINIC | Age: 66
End: 2021-04-21

## 2021-04-21 PROCEDURE — 90834 PSYTX W PT 45 MINUTES: CPT | Mod: 95

## 2021-05-05 ENCOUNTER — APPOINTMENT (OUTPATIENT)
Dept: PSYCHIATRY | Facility: CLINIC | Age: 66
End: 2021-05-05
Payer: MEDICAID

## 2021-05-05 PROCEDURE — 90834 PSYTX W PT 45 MINUTES: CPT | Mod: 95

## 2021-05-19 ENCOUNTER — APPOINTMENT (OUTPATIENT)
Dept: PSYCHIATRY | Facility: CLINIC | Age: 66
End: 2021-05-19

## 2021-06-02 ENCOUNTER — APPOINTMENT (OUTPATIENT)
Dept: PSYCHIATRY | Facility: CLINIC | Age: 66
End: 2021-06-02
Payer: MEDICAID

## 2021-06-02 PROCEDURE — 90834 PSYTX W PT 45 MINUTES: CPT | Mod: 95

## 2021-06-03 ENCOUNTER — APPOINTMENT (OUTPATIENT)
Dept: PSYCHIATRY | Facility: CLINIC | Age: 66
End: 2021-06-03

## 2021-06-16 ENCOUNTER — APPOINTMENT (OUTPATIENT)
Dept: PSYCHIATRY | Facility: CLINIC | Age: 66
End: 2021-06-16
Payer: MEDICAID

## 2021-06-16 PROCEDURE — 90834 PSYTX W PT 45 MINUTES: CPT | Mod: 95

## 2021-06-23 ENCOUNTER — APPOINTMENT (OUTPATIENT)
Dept: PSYCHIATRY | Facility: CLINIC | Age: 66
End: 2021-06-23

## 2021-06-30 ENCOUNTER — APPOINTMENT (OUTPATIENT)
Dept: PSYCHIATRY | Facility: CLINIC | Age: 66
End: 2021-06-30
Payer: MEDICAID

## 2021-06-30 PROCEDURE — 90834 PSYTX W PT 45 MINUTES: CPT | Mod: 95

## 2021-07-01 ENCOUNTER — OUTPATIENT (OUTPATIENT)
Dept: OUTPATIENT SERVICES | Facility: HOSPITAL | Age: 66
LOS: 1 days | Discharge: HOME | End: 2021-07-01

## 2021-07-14 ENCOUNTER — APPOINTMENT (OUTPATIENT)
Dept: PSYCHIATRY | Facility: CLINIC | Age: 66
End: 2021-07-14
Payer: MEDICAID

## 2021-07-14 PROCEDURE — 90834 PSYTX W PT 45 MINUTES: CPT | Mod: 95

## 2021-07-20 ENCOUNTER — APPOINTMENT (OUTPATIENT)
Dept: PSYCHIATRY | Facility: CLINIC | Age: 66
End: 2021-07-20

## 2021-07-28 ENCOUNTER — APPOINTMENT (OUTPATIENT)
Dept: PSYCHIATRY | Facility: CLINIC | Age: 66
End: 2021-07-28
Payer: MEDICAID

## 2021-07-28 PROCEDURE — 90834 PSYTX W PT 45 MINUTES: CPT | Mod: 95

## 2021-08-05 ENCOUNTER — APPOINTMENT (OUTPATIENT)
Dept: PSYCHIATRY | Facility: CLINIC | Age: 66
End: 2021-08-05
Payer: MEDICAID

## 2021-08-05 PROCEDURE — 90834 PSYTX W PT 45 MINUTES: CPT | Mod: 95

## 2021-08-11 ENCOUNTER — APPOINTMENT (OUTPATIENT)
Dept: PSYCHIATRY | Facility: CLINIC | Age: 66
End: 2021-08-11

## 2021-08-12 ENCOUNTER — APPOINTMENT (OUTPATIENT)
Dept: PSYCHIATRY | Facility: CLINIC | Age: 66
End: 2021-08-12

## 2021-08-14 DIAGNOSIS — F43.23 ADJUSTMENT DISORDER WITH MIXED ANXIETY AND DEPRESSED MOOD: ICD-10-CM

## 2021-08-17 ENCOUNTER — APPOINTMENT (OUTPATIENT)
Dept: PSYCHIATRY | Facility: CLINIC | Age: 66
End: 2021-08-17

## 2021-08-19 ENCOUNTER — APPOINTMENT (OUTPATIENT)
Dept: PSYCHIATRY | Facility: CLINIC | Age: 66
End: 2021-08-19
Payer: MEDICAID

## 2021-08-19 PROCEDURE — 90834 PSYTX W PT 45 MINUTES: CPT | Mod: 95

## 2021-08-25 ENCOUNTER — APPOINTMENT (OUTPATIENT)
Dept: PSYCHIATRY | Facility: CLINIC | Age: 66
End: 2021-08-25

## 2021-08-26 ENCOUNTER — APPOINTMENT (OUTPATIENT)
Dept: PSYCHIATRY | Facility: CLINIC | Age: 66
End: 2021-08-26
Payer: COMMERCIAL

## 2021-08-26 PROCEDURE — 90834 PSYTX W PT 45 MINUTES: CPT | Mod: 95

## 2021-09-02 ENCOUNTER — APPOINTMENT (OUTPATIENT)
Dept: PSYCHIATRY | Facility: CLINIC | Age: 66
End: 2021-09-02
Payer: COMMERCIAL

## 2021-09-02 PROCEDURE — 90834 PSYTX W PT 45 MINUTES: CPT | Mod: 95

## 2021-09-08 ENCOUNTER — APPOINTMENT (OUTPATIENT)
Dept: PSYCHIATRY | Facility: CLINIC | Age: 66
End: 2021-09-08

## 2021-09-09 ENCOUNTER — APPOINTMENT (OUTPATIENT)
Dept: PSYCHIATRY | Facility: CLINIC | Age: 66
End: 2021-09-09
Payer: COMMERCIAL

## 2021-09-09 PROCEDURE — 90834 PSYTX W PT 45 MINUTES: CPT | Mod: 95

## 2021-09-14 ENCOUNTER — APPOINTMENT (OUTPATIENT)
Dept: PSYCHIATRY | Facility: CLINIC | Age: 66
End: 2021-09-14

## 2021-09-16 ENCOUNTER — APPOINTMENT (OUTPATIENT)
Dept: PSYCHIATRY | Facility: CLINIC | Age: 66
End: 2021-09-16

## 2021-09-22 ENCOUNTER — APPOINTMENT (OUTPATIENT)
Dept: PSYCHIATRY | Facility: CLINIC | Age: 66
End: 2021-09-22

## 2021-09-23 ENCOUNTER — APPOINTMENT (OUTPATIENT)
Dept: PSYCHIATRY | Facility: CLINIC | Age: 66
End: 2021-09-23

## 2021-09-28 ENCOUNTER — APPOINTMENT (OUTPATIENT)
Dept: PSYCHIATRY | Facility: CLINIC | Age: 66
End: 2021-09-28

## 2021-09-30 ENCOUNTER — APPOINTMENT (OUTPATIENT)
Dept: PSYCHIATRY | Facility: CLINIC | Age: 66
End: 2021-09-30

## 2021-10-06 ENCOUNTER — APPOINTMENT (OUTPATIENT)
Dept: PSYCHIATRY | Facility: CLINIC | Age: 66
End: 2021-10-06

## 2021-10-07 ENCOUNTER — APPOINTMENT (OUTPATIENT)
Dept: PSYCHIATRY | Facility: CLINIC | Age: 66
End: 2021-10-07

## 2021-10-07 ENCOUNTER — APPOINTMENT (OUTPATIENT)
Dept: PSYCHIATRY | Facility: CLINIC | Age: 66
End: 2021-10-07
Payer: COMMERCIAL

## 2021-10-07 PROCEDURE — 90834 PSYTX W PT 45 MINUTES: CPT | Mod: 95

## 2021-10-14 ENCOUNTER — APPOINTMENT (OUTPATIENT)
Dept: PSYCHIATRY | Facility: CLINIC | Age: 66
End: 2021-10-14
Payer: COMMERCIAL

## 2021-10-14 ENCOUNTER — APPOINTMENT (OUTPATIENT)
Dept: PSYCHIATRY | Facility: CLINIC | Age: 66
End: 2021-10-14

## 2021-10-14 PROCEDURE — 90834 PSYTX W PT 45 MINUTES: CPT | Mod: 95

## 2021-10-20 ENCOUNTER — APPOINTMENT (OUTPATIENT)
Dept: PSYCHIATRY | Facility: CLINIC | Age: 66
End: 2021-10-20

## 2021-10-21 ENCOUNTER — APPOINTMENT (OUTPATIENT)
Dept: PSYCHIATRY | Facility: CLINIC | Age: 66
End: 2021-10-21

## 2021-10-21 ENCOUNTER — APPOINTMENT (OUTPATIENT)
Dept: PSYCHIATRY | Facility: CLINIC | Age: 66
End: 2021-10-21
Payer: COMMERCIAL

## 2021-10-21 PROCEDURE — 90834 PSYTX W PT 45 MINUTES: CPT | Mod: 95

## 2021-10-28 ENCOUNTER — APPOINTMENT (OUTPATIENT)
Dept: PSYCHIATRY | Facility: CLINIC | Age: 66
End: 2021-10-28

## 2021-11-01 ENCOUNTER — OUTPATIENT (OUTPATIENT)
Dept: OUTPATIENT SERVICES | Facility: HOSPITAL | Age: 66
LOS: 1 days | Discharge: HOME | End: 2021-11-01

## 2021-11-02 ENCOUNTER — APPOINTMENT (OUTPATIENT)
Dept: PSYCHIATRY | Facility: CLINIC | Age: 66
End: 2021-11-02

## 2021-11-03 ENCOUNTER — APPOINTMENT (OUTPATIENT)
Dept: PSYCHIATRY | Facility: CLINIC | Age: 66
End: 2021-11-03

## 2021-11-04 ENCOUNTER — APPOINTMENT (OUTPATIENT)
Dept: PSYCHIATRY | Facility: CLINIC | Age: 66
End: 2021-11-04

## 2021-11-11 ENCOUNTER — APPOINTMENT (OUTPATIENT)
Dept: PSYCHIATRY | Facility: CLINIC | Age: 66
End: 2021-11-11

## 2021-11-17 ENCOUNTER — APPOINTMENT (OUTPATIENT)
Dept: PSYCHIATRY | Facility: CLINIC | Age: 66
End: 2021-11-17

## 2021-11-17 ENCOUNTER — RX RENEWAL (OUTPATIENT)
Age: 66
End: 2021-11-17

## 2021-11-18 ENCOUNTER — APPOINTMENT (OUTPATIENT)
Dept: PSYCHIATRY | Facility: CLINIC | Age: 66
End: 2021-11-18
Payer: MEDICARE

## 2021-11-18 ENCOUNTER — APPOINTMENT (OUTPATIENT)
Dept: PSYCHIATRY | Facility: CLINIC | Age: 66
End: 2021-11-18

## 2021-11-18 PROCEDURE — 90834 PSYTX W PT 45 MINUTES: CPT | Mod: 95

## 2021-11-30 ENCOUNTER — APPOINTMENT (OUTPATIENT)
Dept: PSYCHIATRY | Facility: CLINIC | Age: 66
End: 2021-11-30

## 2021-11-30 RX ORDER — ESCITALOPRAM OXALATE 10 MG/1
10 TABLET ORAL
Qty: 45 | Refills: 2 | Status: DISCONTINUED | COMMUNITY
Start: 2021-11-17 | End: 2021-11-30

## 2021-12-01 ENCOUNTER — APPOINTMENT (OUTPATIENT)
Dept: PSYCHIATRY | Facility: CLINIC | Age: 66
End: 2021-12-01

## 2021-12-02 ENCOUNTER — APPOINTMENT (OUTPATIENT)
Dept: PSYCHIATRY | Facility: CLINIC | Age: 66
End: 2021-12-02

## 2021-12-02 ENCOUNTER — APPOINTMENT (OUTPATIENT)
Dept: PSYCHIATRY | Facility: CLINIC | Age: 66
End: 2021-12-02
Payer: MEDICARE

## 2021-12-02 PROCEDURE — 90834 PSYTX W PT 45 MINUTES: CPT | Mod: 95

## 2021-12-09 ENCOUNTER — APPOINTMENT (OUTPATIENT)
Dept: PSYCHIATRY | Facility: CLINIC | Age: 66
End: 2021-12-09
Payer: MEDICARE

## 2021-12-09 ENCOUNTER — APPOINTMENT (OUTPATIENT)
Dept: PSYCHIATRY | Facility: CLINIC | Age: 66
End: 2021-12-09

## 2021-12-09 PROCEDURE — 90834 PSYTX W PT 45 MINUTES: CPT | Mod: 95

## 2021-12-15 ENCOUNTER — APPOINTMENT (OUTPATIENT)
Dept: PSYCHIATRY | Facility: CLINIC | Age: 66
End: 2021-12-15

## 2021-12-16 ENCOUNTER — APPOINTMENT (OUTPATIENT)
Dept: PSYCHIATRY | Facility: CLINIC | Age: 66
End: 2021-12-16
Payer: MEDICARE

## 2021-12-16 ENCOUNTER — APPOINTMENT (OUTPATIENT)
Dept: PSYCHIATRY | Facility: CLINIC | Age: 66
End: 2021-12-16

## 2021-12-16 PROCEDURE — 90834 PSYTX W PT 45 MINUTES: CPT | Mod: 95

## 2021-12-22 ENCOUNTER — APPOINTMENT (OUTPATIENT)
Dept: PSYCHIATRY | Facility: CLINIC | Age: 66
End: 2021-12-22

## 2021-12-23 ENCOUNTER — APPOINTMENT (OUTPATIENT)
Dept: PSYCHIATRY | Facility: CLINIC | Age: 66
End: 2021-12-23

## 2021-12-28 ENCOUNTER — APPOINTMENT (OUTPATIENT)
Dept: PSYCHIATRY | Facility: CLINIC | Age: 66
End: 2021-12-28

## 2021-12-29 ENCOUNTER — APPOINTMENT (OUTPATIENT)
Dept: PSYCHIATRY | Facility: CLINIC | Age: 66
End: 2021-12-29

## 2021-12-30 ENCOUNTER — APPOINTMENT (OUTPATIENT)
Dept: PSYCHIATRY | Facility: CLINIC | Age: 66
End: 2021-12-30

## 2022-01-05 ENCOUNTER — APPOINTMENT (OUTPATIENT)
Dept: PSYCHIATRY | Facility: CLINIC | Age: 67
End: 2022-01-05
Payer: MEDICARE

## 2022-01-05 PROCEDURE — 90834 PSYTX W PT 45 MINUTES: CPT | Mod: 95

## 2022-01-06 ENCOUNTER — APPOINTMENT (OUTPATIENT)
Dept: PSYCHIATRY | Facility: CLINIC | Age: 67
End: 2022-01-06

## 2022-01-13 ENCOUNTER — APPOINTMENT (OUTPATIENT)
Dept: PSYCHIATRY | Facility: CLINIC | Age: 67
End: 2022-01-13
Payer: MEDICARE

## 2022-01-13 PROCEDURE — 90834 PSYTX W PT 45 MINUTES: CPT | Mod: 95

## 2022-01-20 ENCOUNTER — APPOINTMENT (OUTPATIENT)
Dept: PSYCHIATRY | Facility: CLINIC | Age: 67
End: 2022-01-20
Payer: MEDICARE

## 2022-01-20 PROCEDURE — 90834 PSYTX W PT 45 MINUTES: CPT | Mod: 95

## 2022-01-25 ENCOUNTER — APPOINTMENT (OUTPATIENT)
Dept: PSYCHIATRY | Facility: CLINIC | Age: 67
End: 2022-01-25

## 2022-01-27 ENCOUNTER — APPOINTMENT (OUTPATIENT)
Dept: PSYCHIATRY | Facility: CLINIC | Age: 67
End: 2022-01-27
Payer: MEDICARE

## 2022-01-27 PROCEDURE — 90834 PSYTX W PT 45 MINUTES: CPT | Mod: 95

## 2022-02-03 ENCOUNTER — APPOINTMENT (OUTPATIENT)
Dept: PSYCHIATRY | Facility: CLINIC | Age: 67
End: 2022-02-03

## 2022-02-03 ENCOUNTER — APPOINTMENT (OUTPATIENT)
Dept: PSYCHIATRY | Facility: CLINIC | Age: 67
End: 2022-02-03
Payer: MEDICARE

## 2022-02-03 PROCEDURE — 90834 PSYTX W PT 45 MINUTES: CPT | Mod: 95

## 2022-02-10 ENCOUNTER — APPOINTMENT (OUTPATIENT)
Dept: PSYCHIATRY | Facility: CLINIC | Age: 67
End: 2022-02-10
Payer: MEDICARE

## 2022-02-10 PROCEDURE — 90834 PSYTX W PT 45 MINUTES: CPT | Mod: 95

## 2022-02-17 ENCOUNTER — APPOINTMENT (OUTPATIENT)
Dept: PSYCHIATRY | Facility: CLINIC | Age: 67
End: 2022-02-17
Payer: MEDICARE

## 2022-02-17 PROCEDURE — 90834 PSYTX W PT 45 MINUTES: CPT | Mod: 95

## 2022-02-24 ENCOUNTER — APPOINTMENT (OUTPATIENT)
Dept: PSYCHIATRY | Facility: CLINIC | Age: 67
End: 2022-02-24
Payer: MEDICARE

## 2022-02-24 PROCEDURE — 90834 PSYTX W PT 45 MINUTES: CPT | Mod: 95

## 2022-03-01 ENCOUNTER — APPOINTMENT (OUTPATIENT)
Dept: PSYCHIATRY | Facility: CLINIC | Age: 67
End: 2022-03-01

## 2022-03-03 ENCOUNTER — APPOINTMENT (OUTPATIENT)
Dept: PSYCHIATRY | Facility: CLINIC | Age: 67
End: 2022-03-03
Payer: MEDICARE

## 2022-03-03 PROCEDURE — 90834 PSYTX W PT 45 MINUTES: CPT | Mod: 95

## 2022-03-10 ENCOUNTER — APPOINTMENT (OUTPATIENT)
Dept: PSYCHIATRY | Facility: CLINIC | Age: 67
End: 2022-03-10

## 2022-03-17 ENCOUNTER — APPOINTMENT (OUTPATIENT)
Dept: PSYCHIATRY | Facility: CLINIC | Age: 67
End: 2022-03-17
Payer: MEDICARE

## 2022-03-17 PROCEDURE — 90834 PSYTX W PT 45 MINUTES: CPT | Mod: 95

## 2022-03-24 ENCOUNTER — APPOINTMENT (OUTPATIENT)
Dept: PSYCHIATRY | Facility: CLINIC | Age: 67
End: 2022-03-24

## 2022-03-29 ENCOUNTER — APPOINTMENT (OUTPATIENT)
Dept: PSYCHIATRY | Facility: CLINIC | Age: 67
End: 2022-03-29

## 2022-03-31 ENCOUNTER — APPOINTMENT (OUTPATIENT)
Dept: PSYCHIATRY | Facility: CLINIC | Age: 67
End: 2022-03-31
Payer: MEDICARE

## 2022-03-31 PROCEDURE — 90834 PSYTX W PT 45 MINUTES: CPT | Mod: 95

## 2022-04-07 ENCOUNTER — APPOINTMENT (OUTPATIENT)
Dept: PSYCHIATRY | Facility: CLINIC | Age: 67
End: 2022-04-07
Payer: MEDICARE

## 2022-04-07 PROCEDURE — 90834 PSYTX W PT 45 MINUTES: CPT | Mod: 95

## 2022-04-14 ENCOUNTER — APPOINTMENT (OUTPATIENT)
Dept: PSYCHIATRY | Facility: CLINIC | Age: 67
End: 2022-04-14
Payer: MEDICARE

## 2022-04-14 PROCEDURE — 90834 PSYTX W PT 45 MINUTES: CPT | Mod: 95

## 2022-04-21 ENCOUNTER — APPOINTMENT (OUTPATIENT)
Dept: PSYCHIATRY | Facility: CLINIC | Age: 67
End: 2022-04-21

## 2022-04-26 ENCOUNTER — APPOINTMENT (OUTPATIENT)
Dept: PSYCHIATRY | Facility: CLINIC | Age: 67
End: 2022-04-26

## 2022-04-28 ENCOUNTER — APPOINTMENT (OUTPATIENT)
Dept: PSYCHIATRY | Facility: CLINIC | Age: 67
End: 2022-04-28
Payer: MEDICARE

## 2022-04-28 PROCEDURE — 90834 PSYTX W PT 45 MINUTES: CPT | Mod: 95

## 2022-05-06 ENCOUNTER — APPOINTMENT (OUTPATIENT)
Dept: PSYCHIATRY | Facility: CLINIC | Age: 67
End: 2022-05-06
Payer: MEDICARE

## 2022-05-06 PROCEDURE — 90834 PSYTX W PT 45 MINUTES: CPT | Mod: 95

## 2022-05-12 ENCOUNTER — APPOINTMENT (OUTPATIENT)
Dept: PSYCHIATRY | Facility: CLINIC | Age: 67
End: 2022-05-12
Payer: MEDICARE

## 2022-05-12 PROCEDURE — 90834 PSYTX W PT 45 MINUTES: CPT | Mod: 95

## 2022-05-19 ENCOUNTER — APPOINTMENT (OUTPATIENT)
Dept: PSYCHIATRY | Facility: CLINIC | Age: 67
End: 2022-05-19
Payer: MEDICARE

## 2022-05-19 DIAGNOSIS — F33.1 MAJOR DEPRESSIVE DISORDER, RECURRENT, MODERATE: ICD-10-CM

## 2022-05-19 PROCEDURE — 90834 PSYTX W PT 45 MINUTES: CPT | Mod: 95

## 2022-05-26 ENCOUNTER — APPOINTMENT (OUTPATIENT)
Dept: PSYCHIATRY | Facility: CLINIC | Age: 67
End: 2022-05-26
Payer: MEDICARE

## 2022-05-26 PROCEDURE — 90834 PSYTX W PT 45 MINUTES: CPT | Mod: 95

## 2022-06-02 ENCOUNTER — APPOINTMENT (OUTPATIENT)
Dept: PSYCHIATRY | Facility: CLINIC | Age: 67
End: 2022-06-02
Payer: MEDICARE

## 2022-06-02 PROCEDURE — 90834 PSYTX W PT 45 MINUTES: CPT | Mod: 95

## 2022-06-07 ENCOUNTER — APPOINTMENT (OUTPATIENT)
Dept: PSYCHIATRY | Facility: CLINIC | Age: 67
End: 2022-06-07

## 2022-06-09 ENCOUNTER — APPOINTMENT (OUTPATIENT)
Dept: PSYCHIATRY | Facility: CLINIC | Age: 67
End: 2022-06-09

## 2022-06-16 ENCOUNTER — APPOINTMENT (OUTPATIENT)
Dept: PSYCHIATRY | Facility: CLINIC | Age: 67
End: 2022-06-16
Payer: MEDICARE

## 2022-06-16 PROCEDURE — 90834 PSYTX W PT 45 MINUTES: CPT | Mod: 95

## 2022-06-23 ENCOUNTER — APPOINTMENT (OUTPATIENT)
Dept: PSYCHIATRY | Facility: CLINIC | Age: 67
End: 2022-06-23
Payer: MEDICARE

## 2022-06-23 PROCEDURE — 90834 PSYTX W PT 45 MINUTES: CPT | Mod: 95

## 2022-06-30 ENCOUNTER — APPOINTMENT (OUTPATIENT)
Dept: PSYCHIATRY | Facility: CLINIC | Age: 67
End: 2022-06-30

## 2022-06-30 PROCEDURE — 90834 PSYTX W PT 45 MINUTES: CPT | Mod: 95

## 2022-07-07 ENCOUNTER — APPOINTMENT (OUTPATIENT)
Dept: PSYCHIATRY | Facility: CLINIC | Age: 67
End: 2022-07-07

## 2022-07-07 PROCEDURE — 90834 PSYTX W PT 45 MINUTES: CPT | Mod: 95

## 2022-07-14 ENCOUNTER — APPOINTMENT (OUTPATIENT)
Dept: PSYCHIATRY | Facility: CLINIC | Age: 67
End: 2022-07-14

## 2022-07-21 ENCOUNTER — APPOINTMENT (OUTPATIENT)
Dept: PSYCHIATRY | Facility: CLINIC | Age: 67
End: 2022-07-21

## 2022-07-21 PROCEDURE — 90834 PSYTX W PT 45 MINUTES: CPT | Mod: 95

## 2022-07-28 ENCOUNTER — APPOINTMENT (OUTPATIENT)
Dept: PSYCHIATRY | Facility: CLINIC | Age: 67
End: 2022-07-28

## 2022-07-28 PROCEDURE — 90834 PSYTX W PT 45 MINUTES: CPT | Mod: 95

## 2022-08-04 ENCOUNTER — APPOINTMENT (OUTPATIENT)
Dept: PSYCHIATRY | Facility: CLINIC | Age: 67
End: 2022-08-04

## 2022-08-04 PROCEDURE — 90834 PSYTX W PT 45 MINUTES: CPT | Mod: 95

## 2022-08-09 ENCOUNTER — APPOINTMENT (OUTPATIENT)
Dept: PSYCHIATRY | Facility: CLINIC | Age: 67
End: 2022-08-09

## 2022-08-11 ENCOUNTER — APPOINTMENT (OUTPATIENT)
Dept: PSYCHIATRY | Facility: CLINIC | Age: 67
End: 2022-08-11

## 2022-08-11 PROCEDURE — 90834 PSYTX W PT 45 MINUTES: CPT | Mod: 95

## 2022-08-18 ENCOUNTER — APPOINTMENT (OUTPATIENT)
Dept: PSYCHIATRY | Facility: CLINIC | Age: 67
End: 2022-08-18

## 2022-08-25 ENCOUNTER — APPOINTMENT (OUTPATIENT)
Dept: PSYCHIATRY | Facility: CLINIC | Age: 67
End: 2022-08-25

## 2022-09-01 ENCOUNTER — APPOINTMENT (OUTPATIENT)
Dept: PSYCHIATRY | Facility: CLINIC | Age: 67
End: 2022-09-01

## 2022-09-01 PROCEDURE — 90834 PSYTX W PT 45 MINUTES: CPT | Mod: 95

## 2022-09-06 ENCOUNTER — APPOINTMENT (OUTPATIENT)
Dept: PSYCHIATRY | Facility: CLINIC | Age: 67
End: 2022-09-06

## 2022-09-06 PROCEDURE — 90834 PSYTX W PT 45 MINUTES: CPT | Mod: 95

## 2022-09-08 ENCOUNTER — APPOINTMENT (OUTPATIENT)
Dept: PSYCHIATRY | Facility: CLINIC | Age: 67
End: 2022-09-08

## 2022-09-15 ENCOUNTER — APPOINTMENT (OUTPATIENT)
Dept: PSYCHIATRY | Facility: CLINIC | Age: 67
End: 2022-09-15

## 2022-09-22 ENCOUNTER — APPOINTMENT (OUTPATIENT)
Dept: PSYCHIATRY | Facility: CLINIC | Age: 67
End: 2022-09-22

## 2022-09-27 ENCOUNTER — APPOINTMENT (OUTPATIENT)
Dept: PSYCHIATRY | Facility: CLINIC | Age: 67
End: 2022-09-27

## 2022-09-29 ENCOUNTER — APPOINTMENT (OUTPATIENT)
Dept: PSYCHIATRY | Facility: CLINIC | Age: 67
End: 2022-09-29

## 2022-10-06 ENCOUNTER — APPOINTMENT (OUTPATIENT)
Dept: PSYCHIATRY | Facility: CLINIC | Age: 67
End: 2022-10-06

## 2022-10-06 PROCEDURE — 90834 PSYTX W PT 45 MINUTES: CPT | Mod: 95

## 2022-10-06 NOTE — PLAN
[FreeTextEntry2] : Patient receiving psychotherapy to address symptoms of anxiety and depression related to family stressors [Integrative Therapy] : Integrative Therapy  [de-identified] : Patient presented to session on time.  Patient reported that she has been struggling with the pain and functional impediment that has resulted from her dental work.  Patient explained that it has been a longer process than she had realized it would be and she has experienced some setbacks in her healing.  Patient expressed frustration about her 's interactions with her healthcare providers and his inaction in regards to household chores.  This writer explored these feelings and offered emotional support.  Session ended with patient emotionally stable and behaviorally in control. [Return in ____ week(s)] : Return in [unfilled] week(s) [FreeTextEntry1] : 1x/weekly psychotherapy and regular psychiatric treatment

## 2022-10-06 NOTE — REASON FOR VISIT
[Patient] : Patient [FreeTextEntry1] : Anxiety and depression related to martial and family stressors

## 2022-10-06 NOTE — END OF VISIT
[Duration of Psychotherapy Visit (minutes spent in synchronous communication): ____] : Duration of Psychotherapy Visit (minutes spent in synchronous communication): [unfilled] [Individual Psychotherapy for 38-52 minutes] : Individual Psychotherapy for 38 - 52 minutes [Teletherapy Service Provided] : The services provided in this session were delivered via tele-therapy [FreeTextEntry3] : [place] [FreeTextEntry5] : DANNA Ortiz [Licensed Clinician] : Licensed Clinician

## 2022-10-13 ENCOUNTER — APPOINTMENT (OUTPATIENT)
Dept: PSYCHIATRY | Facility: CLINIC | Age: 67
End: 2022-10-13

## 2022-10-13 PROCEDURE — 90834 PSYTX W PT 45 MINUTES: CPT | Mod: 95

## 2022-10-13 NOTE — PLAN
[FreeTextEntry2] : Patient receiving psychotherapy to address symptoms of anxiety and depression related to family stressors [Integrative Therapy] : Integrative Therapy  [de-identified] : Patient presented to session on time.  Patient reported that she is worried about her son because he has not been applying for jobs and is waiting for someone to contact him.  Patient discussed her feelings about helping him and tisha connections to her relationship with her own mother and her challenges to become independent as an emerging adult.  This writer offered emotional support helped patient think of news ways of supporting her son.  Session ended with patient emotionally stable and behaviorally in control. [Return in ____ week(s)] : Return in [unfilled] week(s) [FreeTextEntry1] : 1x/weekly psychotherapy and regular psychiatric treatment

## 2022-10-20 ENCOUNTER — APPOINTMENT (OUTPATIENT)
Dept: PSYCHIATRY | Facility: CLINIC | Age: 67
End: 2022-10-20

## 2022-10-25 ENCOUNTER — APPOINTMENT (OUTPATIENT)
Dept: PSYCHIATRY | Facility: CLINIC | Age: 67
End: 2022-10-25

## 2022-10-25 NOTE — DISCUSSION/SUMMARY
[FreeTextEntry1] : Miladys is a 66 year old  domiciled White Female, immigrant, currently unemployed, domiciled with  and adult son, with history of depression and anxiety symptoms, who is assessed via telehealth for medication management. She works with Sanjay Solis for therapy weekly. She is currently on Lexapro and Wellbutrin and is also prescribed Valium 5 mg po PRN for anxiety, 10 pills / month. Today, patient presents with continued low mood and some ongoing anxiety in relation to her marriage and dental issues. She is encouraged to discuss this further in therapy and work on breathing/grounding exercises for when anxiety gets uncontrollable before turning to valium.\par \par Patient has history of requiring up to 15 tablets/ month in times of higher stress, however has largely been able to manage with 10 pills/ months and continued to encourage to strengthen use coping skills vs Valium as anxiety is largely surrounding psychosocial conflict with her . She reports needing 10mg instead of 5mg of Valium in order to feel the effect lately.  Risks vs. benefits of current regimen, alternative discussed. She only required 4 pills this past month. But given patient's upcoming vacation, follow up will not be for 1.5 months so patient will receive 10 pills to cover her for the next 1.5-2 months, which patient understands and agrees to.\par \par She has hx of disrupted sleep patterns of staying up late at night to watch TV as she does not get a lot of self-care time during the day.  Patient advised of benefits of behavioral activation techniques for self-care and is motivated to increase activity level. No acute safety concerns and patient does not appear to be at imminent risk to herself or others. \par \par PLAN:\par 1. Medication management as follows:\par - Continue Lexapro 20mg po daily for depression/anxiety \par - Continue Wellbutrin XL 150mg po daily for mood \par - Continue Valium 5 mg po PRN nightly, #5 pills x 30 days.\par -continue OTC Melatonin 5mg PO PRN\par 2. Continue with therapist on weekly basis - Jose Solis, as prescribed.\par 3. Patient aware of emergency protocol: call 911 or go to the nearest emergency room in case of urgent, emergent symptoms, suicidality or homicidality. Contact my office or  for routine questions or concerns.\par 4. follow up in 4 weeks

## 2022-10-25 NOTE — PHYSICAL EXAM
[FreeTextEntry2] : VIRTUAL RAVI [FreeTextEntry3] : VIRTUAL RAVI [FreeTextEntry4] : VIRTUAL RAVI [Cooperative] : cooperative [Euthymic] : euthymic [Full] : full [Clear] : clear [Linear/Goal Directed] : linear/goal directed [None] : none [None Reported] : none reported [Average] : average [WNL] : within normal limits [FreeTextEntry1] : middle aged white female, appears stated age. Appropriate grooming

## 2022-10-25 NOTE — REASON FOR VISIT
[Patient] : Patient [FreeTextEntry1] : med management [Home] : at home, [unfilled] , at the time of the visit. [Medical Office: ([facility])___] : at the medical office located in  [Verbal consent obtained from patient] : the patient, [unfilled]

## 2022-10-25 NOTE — HISTORY OF PRESENT ILLNESS
[FreeTextEntry1] : Follow up is conducted virtually today.\par \par Miladys did not turn on the camera for session today due to embarrassment of not having any teeth currently. She has been undergoing dental procedures to remove her teeth in order to get veneers placed. She continues to struggle with eating currently and is too self-conscious to leave the house. She reports taking 4 total pills of Valium since last session, currently having 5 left over. She reports taking two at a time because she has found 1 pill insufficient at treating her symptoms. Her feelings are validated and supportive therapy is provided. She reports sleep has been decreased due to staying up late to watch television, which she reports is her only time to enjoy time to herself.\par \par She does not voice sx of acute depressive episode, uncontrollable or excessive worry or panic attack, sx of psychosis or fabricio or suicidal / homicidal ideation, intent or plan. [FreeTextEntry2] : Previous psychotropic trials: Zoloft (inefficacious and sexual dysfunction),\par Has been following as OP at [facility] for depression and anxiety for years.\par No hx of IPP/SA/SIB. \par \par \par Ongoing stressor:  has OCD, ADHD and was decompensating with COVID pandemic, marriage conflict and couple's counseling, hoarding in her apartment, she can not leave the home to get space because she is no longer working 2/2 COVID with no relief or daily walks, her daughter has moved out into her own apartment, but depends on her for social comfort; her son has neurofibromatosis and needs continued monitoring for his neurologic lesions ([id number] scans) Patient and  terminated couple's counselling in Dec 2020, as the meetings were causing more conflict.

## 2022-10-25 NOTE — CURRENT PSYCHIATRIC SYMPTOMS
[Depressed Mood] : no depressed mood [Anhedonia] : no anhedonia [Decreased Concentration] : no decrease in concentrating ability [Insomnia] : no insomnia disorder [Euphoria] : no euphoria [Increased Activity] : no increased in activity [Distractibility] : not distracted [Talkativeness] : no talkativeness [Grandeur] : no feelings of grandeur [Dec Need For Sleep] : no decreased need for sleep [Delusions] : no ~T delusions [Hallucination Auditory] : no auditory hallucinations [Excessive Worry] : no excessive worries [Ruminations] : ruminations [Panic] : no panic disorder

## 2022-10-25 NOTE — SOCIAL HISTORY
[Lives with Spouse] : lives with spouse [With Family] : lives with family [Unemployed] : unemployed [] :  [# Of Children ___] : has [unfilled] children [College] : College [None] : none [FreeTextEntry1] : Social hx: Immigrated to [place] 16 from [place];  28 years; Pre-COVID worked 3 jobs. Reports that the deaths of her mom and her brother in 1999 and 2005 were huge setbacks for her.  She has 2 kids. (Son is 23 yo, with neurofibromatosis, requiring multiple surgeries, currently living at home and looking for a job;  and a daughter, aged 29yo, who has 'special needs' and started living on her own in 2020. Patient visits her daughter often to avoid conflict at home with her spouse who has OCD>  [No Known Substance Use] : no known substance use

## 2022-10-27 ENCOUNTER — APPOINTMENT (OUTPATIENT)
Dept: PSYCHIATRY | Facility: CLINIC | Age: 67
End: 2022-10-27

## 2022-10-27 PROCEDURE — 90834 PSYTX W PT 45 MINUTES: CPT | Mod: 95

## 2022-10-27 NOTE — END OF VISIT
[Duration of Psychotherapy Visit (minutes spent in synchronous communication): ____] : Duration of Psychotherapy Visit (minutes spent in synchronous communication): [unfilled] [Individual Psychotherapy for 38-52 minutes] : Individual Psychotherapy for 38 - 52 minutes [Teletherapy Service Provided] : The services provided in this session were delivered via tele-therapy [FreeTextEntry5] : DANNA Ortiz [FreeTextEntry3] : [place], [place] [Licensed Clinician] : Licensed Clinician

## 2022-11-04 ENCOUNTER — APPOINTMENT (OUTPATIENT)
Dept: PSYCHIATRY | Facility: CLINIC | Age: 67
End: 2022-11-04

## 2022-11-04 PROCEDURE — 90834 PSYTX W PT 45 MINUTES: CPT | Mod: 95

## 2022-11-04 NOTE — PLAN
[FreeTextEntry2] : Patient receiving psychotherapy to address symptoms of anxiety and depression related to family stressors [Integrative Therapy] : Integrative Therapy  [de-identified] : Patient presented to session on time.  Patient discussed frustrations with her  and his interference in her dental care.  Patient and this writer explored the nature of her 's interference and her thoughts and feelings related to the interaction.  This writer offered emotional support and helped patient draw connections to previous experiences in her marriage. Session ended with patient emotionally stable and behaviorally in control. [Return in ____ week(s)] : Return in [unfilled] week(s) [FreeTextEntry1] : 1x/weekly psychotherapy and regular psychiatric treatment

## 2022-11-10 ENCOUNTER — APPOINTMENT (OUTPATIENT)
Dept: PSYCHIATRY | Facility: CLINIC | Age: 67
End: 2022-11-10

## 2022-11-10 PROCEDURE — 90834 PSYTX W PT 45 MINUTES: CPT | Mod: 95

## 2022-11-10 NOTE — PLAN
[FreeTextEntry2] : Patient receiving psychotherapy to address symptoms of anxiety and depression related to family stressors [Integrative Therapy] : Integrative Therapy  [de-identified] : Patient presented to session on time.  Patient discussed her concerns with her 's health and her son's time management skills.  Patient tisha connections to her family of origin and her family's experience of surviving the Holocaust and it's impact on her thoughts and feelings about parenting and value system.  This writer explored these thoughts and feelings and offered emotional support.  Session ended with patient emotionally stable and behaviorally in control. [Return in ____ week(s)] : Return in [unfilled] week(s) [FreeTextEntry1] : 1x/weekly psychotherapy and regular psychiatric treatment

## 2022-11-17 ENCOUNTER — APPOINTMENT (OUTPATIENT)
Dept: PSYCHIATRY | Facility: CLINIC | Age: 67
End: 2022-11-17

## 2022-11-17 PROCEDURE — 90834 PSYTX W PT 45 MINUTES: CPT | Mod: 95

## 2022-11-17 NOTE — PLAN
[FreeTextEntry2] : Patient receiving psychotherapy to address symptoms of anxiety and depression related to family stressors [Integrative Therapy] : Integrative Therapy  [de-identified] : Patient presented to session on time.  Patient discussed her frustrations with her  and her feelings about his refusal to respond to her requests.  Patient and this writer clarified her challenge in the relationship and tisah connections to a fear of feeling grief and hopelessness and the acknowledgement of her lack of control over others.  This writer offered emotional support and helped patient draw connections to childhood experiences and session ended with patient emotionally stable and behaviorally in control. [Return in ____ week(s)] : Return in [unfilled] week(s) [FreeTextEntry1] : 1x/weekly psychotherapy and regular psychiatric treatment

## 2022-11-22 ENCOUNTER — APPOINTMENT (OUTPATIENT)
Dept: PSYCHIATRY | Facility: CLINIC | Age: 67
End: 2022-11-22

## 2022-11-23 NOTE — CURRENT PSYCHIATRIC SYMPTOMS
[Ruminations] : ruminations [Depressed Mood] : no depressed mood [Anhedonia] : no anhedonia [Decreased Concentration] : no decrease in concentrating ability [Insomnia] : no insomnia disorder [Euphoria] : no euphoria [Increased Activity] : no increased in activity [Distractibility] : not distracted [Talkativeness] : no talkativeness [Grandeur] : no feelings of grandeur [Dec Need For Sleep] : no decreased need for sleep [Delusions] : no ~T delusions [Hallucination Auditory] : no auditory hallucinations [Excessive Worry] : no excessive worries [Panic] : no panic disorder

## 2022-11-23 NOTE — HISTORY OF PRESENT ILLNESS
[FreeTextEntry1] : Follow up is conducted virtually today.\par \par Miladys showed up on screen today with temporary teeth in place. She reports some improvement in mood since having the temporary replacements in place. She reports 1 instance this past month of taking 2 Valium pills, following an argument with her . She reports having 5 left over. Provider agrees to sending 5 more given the uncertainty of her next appointment with new provider. Otherwise, she continues to report ongoing marital issues with her  but no new complaints. Her feelings are validated and supportive therapy is provided. She reports sleep has been decreased due to staying up late to watch television, which she reports is her only time to enjoy time to herself.\par \par She does not voice sx of acute depressive episode, uncontrollable or excessive worry or panic attack, sx of psychosis or fabricio or suicidal / homicidal ideation, intent or plan. [FreeTextEntry2] : Previous psychotropic trials: Zoloft (inefficacious and sexual dysfunction),\par Has been following as OP at [facility] for depression and anxiety for years.\par No hx of IPP/SA/SIB. \par \par \par Ongoing stressor:  has OCD, ADHD and was decompensating with COVID pandemic, marriage conflict and couple's counseling, hoarding in her apartment, she can not leave the home to get space because she is no longer working 2/2 COVID with no relief or daily walks, her daughter has moved out into her own apartment, but depends on her for social comfort; her son has neurofibromatosis and needs continued monitoring for his neurologic lesions ([id number] scans) Patient and  terminated couple's counselling in Dec 2020, as the meetings were causing more conflict.

## 2022-11-23 NOTE — DISCUSSION/SUMMARY
[FreeTextEntry1] : Miladys is a 66 year old  domiciled White Female, immigrant, currently unemployed, domiciled with  and adult son, with history of depression and anxiety symptoms, who is assessed via telehealth for medication management. She works with Sanjay Solis for therapy weekly. She is currently on Lexapro and Wellbutrin and is also prescribed Valium 5 mg po PRN for anxiety, 10 pills / month. Today, patient presents with continued low mood and some ongoing anxiety in relation to her marriage and dental issues. She is encouraged to discuss this further in therapy and work on breathing/grounding exercises for when anxiety gets uncontrollable before turning to Valium.\par \par Patient has history of requiring up to 15 tablets/ month in times of higher stress, however has largely been able to manage with 10 pills/ months and continued to encourage to strengthen use coping skills vs Valium as anxiety is largely surrounding psychosocial conflict with her . She reports needing 10mg instead of 5mg of Valium in order to feel the effect lately.  Risks vs. benefits of current regimen, alternative discussed. She only required 2 pills this past month.\par \par She has hx of disrupted sleep patterns of staying up late at night to watch TV as she does not get a lot of self-care time during the day.  Patient advised of benefits of behavioral activation techniques for self-care and is motivated to increase activity level. No acute safety concerns and patient does not appear to be at imminent risk to herself or others. \par \par PLAN:\par 1. Medication management as follows:\par - Continue Lexapro 20mg po daily for depression/anxiety \par - Continue Wellbutrin XL 150mg po daily for mood \par - Continue Valium 5 mg po PRN nightly, #5 pills x 30 days.\par -continue OTC Melatonin 5mg PO PRN\par 2. Continue with therapist on weekly basis - Jose Solis, as prescribed.\par 3. Patient aware of emergency protocol: call 911 or go to the nearest emergency room in case of urgent, emergent symptoms, suicidality or homicidality. Contact my office or  for routine questions or concerns.\par 4. follow up with new provider; transition of care discussed.

## 2022-11-23 NOTE — REASON FOR VISIT
[Patient] : Patient [Home] : at home, [unfilled] , at the time of the visit. [Medical Office: ([facility])___] : at the medical office located in  [Verbal consent obtained from patient] : the patient, [unfilled] [FreeTextEntry1] : med management

## 2022-11-23 NOTE — PHYSICAL EXAM
[Cooperative] : cooperative [Euthymic] : euthymic [Full] : full [Clear] : clear [Linear/Goal Directed] : linear/goal directed [None] : none [None Reported] : none reported [Average] : average [WNL] : within normal limits [FreeTextEntry2] : VIRTUAL RAVI [FreeTextEntry3] : VIRTUAL RAVI [FreeTextEntry4] : VIRTUAL RAVI [FreeTextEntry1] : middle aged white female, appears stated age. Appropriate grooming

## 2022-11-23 NOTE — SOCIAL HISTORY
[Lives with Spouse] : lives with spouse [With Family] : lives with family [Unemployed] : unemployed [] :  [# Of Children ___] : has [unfilled] children [College] : College [None] : none [No Known Substance Use] : no known substance use [FreeTextEntry1] : Social hx: [place] to [place] 16 from [place];  28 years; Pre-COVID worked 3 jobs. Reports that the deaths of her mom and her brother in 1999 and 2005 were huge setbacks for her.  She has 2 kids. (Son is 21 yo, with neurofibromatosis, requiring multiple surgeries, currently living at home and looking for a job;  and a daughter, aged 27yo, who has 'special needs' and started living on her own in 2020. Patient visits her daughter often to avoid conflict at home with her spouse who has OCD>

## 2022-12-01 ENCOUNTER — APPOINTMENT (OUTPATIENT)
Dept: PSYCHIATRY | Facility: CLINIC | Age: 67
End: 2022-12-01

## 2022-12-01 PROCEDURE — 90834 PSYTX W PT 45 MINUTES: CPT | Mod: 95

## 2022-12-01 NOTE — PLAN
[FreeTextEntry2] : Patient receiving psychotherapy to address symptoms of anxiety and depression related to family stressors [Integrative Therapy] : Integrative Therapy  [de-identified] : Patient presented to session on time.  Patient discussed an insight she had into her marriage that she is only able to control herself and that she cannot control her  or others, despite how challenging it is to accept him as he is.  Patient and this writer explored these feelings and the specifics of what she finds unacceptable and tisha connections to her 's early life experiences to enhance empathy for these traits. This writer offered emotional support and validation and session ended with patient emotionally stable and behaviorally in control. [Return in ____ week(s)] : Return in [unfilled] week(s) [FreeTextEntry1] : 1x/weekly psychotherapy and regular psychiatric treatment

## 2022-12-09 ENCOUNTER — APPOINTMENT (OUTPATIENT)
Dept: PSYCHIATRY | Facility: CLINIC | Age: 67
End: 2022-12-09

## 2022-12-15 ENCOUNTER — APPOINTMENT (OUTPATIENT)
Dept: PSYCHIATRY | Facility: CLINIC | Age: 67
End: 2022-12-15

## 2022-12-15 PROCEDURE — 90834 PSYTX W PT 45 MINUTES: CPT | Mod: 95

## 2022-12-15 NOTE — PLAN
[FreeTextEntry2] : Patient receiving psychotherapy to address symptoms of anxiety and depression related to family stressors [Integrative Therapy] : Integrative Therapy  [de-identified] : Patient presented to session on time.  Patient discussed her recent dental procedures and her thoughts and feelings about self care versus care for others.  Patient expressed frustration and worry about her son's inability to motivate himself in his job search and his lack of interest in becoming independent.  Patient wondered if she was unconsciously sabotaging her success and her family's success.  This writer explored this idea and helped patient draw connections to her behaviors and the secondary gain that comes with the current family dynamic.  Session ended with patient emotionally stable and behaviorally in control. [Return in ____ week(s)] : Return in [unfilled] week(s) [FreeTextEntry1] : 1x/weekly psychotherapy and regular psychiatric treatment

## 2022-12-22 ENCOUNTER — APPOINTMENT (OUTPATIENT)
Dept: PSYCHIATRY | Facility: CLINIC | Age: 67
End: 2022-12-22

## 2022-12-29 ENCOUNTER — APPOINTMENT (OUTPATIENT)
Dept: PSYCHIATRY | Facility: CLINIC | Age: 67
End: 2022-12-29

## 2023-01-05 ENCOUNTER — APPOINTMENT (OUTPATIENT)
Dept: PSYCHIATRY | Facility: CLINIC | Age: 68
End: 2023-01-05
Payer: MEDICARE

## 2023-01-05 PROCEDURE — 90834 PSYTX W PT 45 MINUTES: CPT | Mod: 95

## 2023-01-05 NOTE — PLAN
[FreeTextEntry2] : Patient receiving psychotherapy to address symptoms of anxiety and depression related to family stressors [Integrative Therapy] : Integrative Therapy  [de-identified] : Patient presented to session on time.  Patient discussed her frustrations with her  and his recent apparent shift in attitude about traditional gender roles. Patient and this writer explored the roles each spouse has in their marriage and her 's issues with the division of labor in the family.  Patient asked this writer for advice about navigating psychiatric healthcare for her daughter and the possibility of receiving treatment at [facility].  This writer will follow up with the  prior to next session.  Session ended with patient emotionally stable and behaviorally in control. [Return in ____ week(s)] : Return in [unfilled] week(s) [FreeTextEntry1] : 1x/weekly psychotherapy and regular psychiatric treatment

## 2023-01-12 ENCOUNTER — APPOINTMENT (OUTPATIENT)
Dept: PSYCHIATRY | Facility: CLINIC | Age: 68
End: 2023-01-12
Payer: MEDICARE

## 2023-01-12 PROCEDURE — 90834 PSYTX W PT 45 MINUTES: CPT | Mod: 95

## 2023-01-12 NOTE — PLAN
[FreeTextEntry2] : Patient receiving psychotherapy to address symptoms of anxiety and depression related to family stressors [Integrative Therapy] : Integrative Therapy  [de-identified] : Patient presented to session on time.  Patient discussed her recent efforts to engage her son in the process of looking for employment and shared her 's work history while drawing connections to her current family dynamics with the help of this writer.  Patient and this writer explored the patient's own experience with employment and her family of origin's attitude towards employment.  This writer explored patient's thoughts and feelings and offered emotional support.  Session ended with patient emotionally stable and behaviorally in control. [Return in ____ week(s)] : Return in [unfilled] week(s) [FreeTextEntry1] : 1x/weekly psychotherapy and regular psychiatric treatment

## 2023-01-17 ENCOUNTER — APPOINTMENT (OUTPATIENT)
Dept: PSYCHIATRY | Facility: CLINIC | Age: 68
End: 2023-01-17
Payer: MEDICARE

## 2023-01-17 VITALS — BODY MASS INDEX: 27.31 KG/M2 | HEIGHT: 64 IN | WEIGHT: 160 LBS

## 2023-01-17 DIAGNOSIS — Z87.891 PERSONAL HISTORY OF NICOTINE DEPENDENCE: ICD-10-CM

## 2023-01-17 DIAGNOSIS — I10 ESSENTIAL (PRIMARY) HYPERTENSION: ICD-10-CM

## 2023-01-17 PROCEDURE — 99215 OFFICE O/P EST HI 40 MIN: CPT | Mod: 95

## 2023-01-17 RX ORDER — CHROMIUM 200 MCG
TABLET ORAL
Refills: 0 | Status: ACTIVE | COMMUNITY

## 2023-01-17 RX ORDER — NEBIVOLOL 2.5 MG/1
2.5 TABLET ORAL DAILY
Refills: 0 | Status: ACTIVE | COMMUNITY

## 2023-01-17 RX ORDER — ROSUVASTATIN CALCIUM 10 MG/1
10 TABLET, FILM COATED ORAL DAILY
Refills: 0 | Status: ACTIVE | COMMUNITY

## 2023-01-17 NOTE — HISTORY OF PRESENT ILLNESS
[FreeTextEntry1] : Past 2 months:  "The same."  Worry is more of an issue than depressed mood.  "I am not depressed."  "The medicine has been very helpful - no SEs."  Dental surgery is a major event for pt.  "The unusual ups and downs w family."   Ongoing marital issues with her .   is in tx at [facility] - he has ADD.  His [employer] business closed 8 yrs ago and pandemic was negative. Loulou deteriorated after birth of first child/living next to in-laws.    Dtr 31 has intellectual disability - lives on own but texts pt constantly.  Son is 25 - fibromatosis (has had major surgery) and unemployed. Mother  .  Brother   - age 57 from aortic aneurysm.  Sex life has deteriorated for many reasons but they have sex several times per week.  She reports sleep has been decreased due to staying up late to watch television, which she reports is her only time to enjoy time to herself.  Rarely goes out since pandemic.  Occas glass wine with dinner.  No cannabis.  \par  [FreeTextEntry2] : Previous psychotropic trials: Zoloft (inefficacious and sexual dysfunction),\par Has been following as OP at [facility] for depression and anxiety for years.\par No hx of IPP/SA/SIB. \par  has OCD, ADHD and was decompensating with COVID pandemic, marriage conflict and couple's counseling, hoarding in her apartment, she can not leave the home to get space because she is no longer working 2/2 COVID with no relief or daily walks, her daughter has moved out into her own apartment, but depends on her for social comfort; her son has neurofibromatosis and needs continued monitoring for his neurologic lesions ([id number] scans) Patient and  terminated couple's counselling in Dec 2020, as the meetings were causing more conflict.

## 2023-01-17 NOTE — SOCIAL HISTORY
[Lives with Spouse] : lives with spouse [With Family] : lives with family [Unemployed] : unemployed [] :  [# Of Children ___] : has [unfilled] children [College] : College [None] : none [No Known Substance Use] : no known substance use [FreeTextEntry1] : Social hx: Immigrated to [place] 16 from [place];  28 years; Pre-COVID worked 3 jobs. Reports that the deaths of her mom and her brother in 1999 and 2005 were huge setbacks for her.  She has 2 kids. (Son is 23 yo, with neurofibromatosis, requiring multiple surgeries, currently living at home and looking for a job;  and a daughter, aged 29yo, who has 'special needs' and started living on her own in 2020. Patient visits her daughter often to avoid conflict at home with her spouse who has OCD>

## 2023-01-17 NOTE — DISCUSSION/SUMMARY
[FreeTextEntry1] : Miladys is a 66 year old  domiciled White Female, immigrant from [facility] since 1971, retired,  domiciled with  and adult son, with history of depression and anxiety symptoms. Patient has history of requiring up to diazepam 15 tablets/ month in times of higher stress, however has largely been able to manage with 10 pills/ months and continued to encourage to strengthen use coping skills vs Valium as anxiety is largely surrounding psychosocial conflict with her . She reports needing 10mg instead of 5mg of Valium in order to feel the effect lately.  She has hx of disrupted sleep patterns of staying up late at night to watch TV as she does not get a lot of self-care time during the day.  Patient advised of benefits of behavioral activation techniques for self-care and is motivated to increase activity level. \par \par I discussed plan to actively reduce diazepam prns and to increase activity and socialization.  Risk of falls w bzd emphasized.  \par \par I requested that pt fax copies of most recent labs\par \par PLAN:\par continue Lexapro 20mg po daily\par continue Wellbutrin XL 150mg po daily\par continue Valium 5 mg po PRN nightly, #5 (has #10 at this time)\par continue OTC Melatonin 5mg PO PRN\par next wt 1/2024\par next annual PE 8/2022\par weekly 1:1 w Jose Solis LCSW\par RT 6 wks\par \par

## 2023-01-17 NOTE — REASON FOR VISIT
[Patient] : Patient [Home] : at home, [unfilled] , at the time of the visit. [Other Location: e.g. Home (Enter Location, City,State)___] : at [unfilled] [Verbal consent obtained from patient] : the patient, [unfilled] [FreeTextEntry1] : med management

## 2023-01-20 ENCOUNTER — APPOINTMENT (OUTPATIENT)
Dept: PSYCHIATRY | Facility: CLINIC | Age: 68
End: 2023-01-20
Payer: MEDICARE

## 2023-01-20 PROCEDURE — 90834 PSYTX W PT 45 MINUTES: CPT | Mod: 95

## 2023-01-20 NOTE — PLAN
[FreeTextEntry2] : Patient receiving psychotherapy to address symptoms of anxiety and depression related to family stressors [Integrative Therapy] : Integrative Therapy  [de-identified] : Patient presented to session on time.  Patient discussed her feelings about her son and his job search and her relationship with her daughter.  This writer explored these feelings and offered emotional support.  Patient reflected on her progress in therapy and was proud of how much she has accomplished through this work.  This writer offered validation and session ended with patient emotionally stable and behaviorally in control. [Return in ____ week(s)] : Return in [unfilled] week(s) [FreeTextEntry1] : 1x/weekly psychotherapy and regular psychiatric treatment

## 2023-01-22 ENCOUNTER — NON-APPOINTMENT (OUTPATIENT)
Age: 68
End: 2023-01-22

## 2023-01-26 ENCOUNTER — APPOINTMENT (OUTPATIENT)
Dept: PSYCHIATRY | Facility: CLINIC | Age: 68
End: 2023-01-26
Payer: MEDICARE

## 2023-01-26 PROCEDURE — 90834 PSYTX W PT 45 MINUTES: CPT | Mod: 95

## 2023-01-26 NOTE — PHYSICAL EXAM
[Cooperative] : cooperative [Euthymic] : euthymic [Clear] : clear [Full] : full [Linear/Goal Directed] : linear/goal directed [Average] : average [WNL] : within normal limits

## 2023-01-26 NOTE — PLAN
[FreeTextEntry2] : Patient receiving psychotherapy to address symptoms of anxiety and depression related to family stressors [Integrative Therapy] : Integrative Therapy  [de-identified] : Patient presented to session on time.  Patient discussed her feelings about her  and how he frustrates her with his mode of communication.  Patient and this writer explored the impact it has on the patient and she blames this for her inaction in pursuing the things she wants in her life.  This writer identified the defense of externalizing and encouraged patient to identify an internal emotional problem she would like help with.  Patient and this writer explored the secondary gain of her marital dynamic and this writer offered emotional support.  Session ended with patient emotionally stable and behaviorally in control. [Return in ____ week(s)] : Return in [unfilled] week(s) [FreeTextEntry1] : 1x/weekly psychotherapy and regular psychiatric treatment

## 2023-02-02 ENCOUNTER — APPOINTMENT (OUTPATIENT)
Dept: PSYCHIATRY | Facility: CLINIC | Age: 68
End: 2023-02-02

## 2023-02-09 ENCOUNTER — APPOINTMENT (OUTPATIENT)
Dept: PSYCHIATRY | Facility: CLINIC | Age: 68
End: 2023-02-09
Payer: MEDICARE

## 2023-02-09 PROCEDURE — 90834 PSYTX W PT 45 MINUTES: CPT | Mod: 95

## 2023-02-09 NOTE — PHYSICAL EXAM
[2 - 2  to 4 times a month] : 2 - 2  to 4 times a month [0 - 1 or 2] : 0 - 1 or 2 [0 - Never] : 0 - Never [0 - No] : 0 - No [6] : 6 [2] : 2 [3] : 3 [1] : 1 [FreeTextEntry1] : 2 [SchwartzScore] : 25

## 2023-02-09 NOTE — DISCUSSION/SUMMARY
[Plan Review] : Plan Review [Able to manage surrounding demands and opportunities] : able to manage surrounding demands and opportunities [Able to set and pursue goals] : able to set and pursue goals [Motivated to participate in treatment] : motivated to participate in treatment [In good health] : in good health [Housing stability] : housing stability [English fluency] : English fluency [Connected to healthcare] : connected to healthcare [Initial] : Initial [___ times a week] : [unfilled] times a week [Improvement in symptoms as evidenced by:] : Improvement in symptoms as evidenced by: [None - Reason others did not participate:] : None - Reason others did not participate:  [Psychiatric Provider/Prescriber] : Psychiatric Provider/Prescriber [Mental Health] : Mental Health [Continued - Progress made] : Continued - Progress made: [every ___ months] : every [unfilled] months [FreeTextEntry2] : 8/9/23 [FreeTextEntry8] : Feelings of hopelessness might derail treatment, feelings of fatigue and overwhelm can also lead to avoidance for the patient. [FreeTextEntry9] : Patient is [place] and is a  immigrant, but identifies as American. [FreeTextEntry1] : Patient would like to maintain a comfortable living space to facilitate socialization [FreeTextEntry4] : "I want to get the apartment to a state when I can not panic when someone wants to come over" [de-identified] : Patient struggles with hoarding [de-identified] : Patient will shred all seven boxes of papers she has in her home currently [de-identified] : 2/4/23 [de-identified] : Patient shredded all the boxes mentioned.  Patient will continue to sort through her papers to find what can be discarded versus what needs to be saved. Patient commits to organizing and disposing of all of her papers in the next six months. [de-identified] : Patient will organize and dispose of half of her clothing [de-identified] : 2/4/23 [FreeTextEntry5] : Patient reports making significant progress in disposing of papers and clothing, but identifies there is still work left to do in this area. [de-identified] : Pankaj Dhaliwal MD [de-identified] : Sanjay Solis, PREETR [de-identified] : Patient will report feeling excited to invite people over to her home [de-identified] : not applicable [de-identified] : Pankaj Dhaliwal MD [Unknown] : Unknown [No] : No [Yes: Details:___] : Yes: [unfilled] [Completed, copy requested] : Completed, copy requested [Ordered] : Ordered [Yes] : Yes [Positive] : Positive [Vaccinated?] : is vaccinated [Date of last vaccination (mm/dd/yy):___] : date of last vaccination: [unfilled] [Education provided about COVID-19?] : Education provided about COVID-19 [Does patient use tobacco products?] : Patient does not use tobacco products [Does patient use medical marijuana?] : Patient does not use medical marijuana. [Patient has been tested for HIV?] : Patient has not been tested for HIV [Patient has been tested for Hepatitis?] : Patient has not been tested for hepatitis [Patient would like to be tested/re-tested?] : patient would not like to be tested/re-tested [Current or past COVID-19 diagnosis?] : Patient does not have a current or past COVID-19 diagnosis [de-identified] : unknown

## 2023-02-09 NOTE — RISK ASSESSMENT
[No, patient denies ideation or behavior] : No, patient denies ideation or behavior [No] : No [Yes, more than three months ago] : Yes, more than three months ago [Low acute suicide risk] : Low acute suicide risk [TextBox_32] : Patient denies current SI, plan and intent.  At age 17 patient attempted suicide by taking three or four asprin and disclosed this to her brother immediately after.  No medical attention was needed and there was no psychiatric follow up.  Patient denies any other suicidal gestures or attempts. [FreeTextEntry9] : Patient denies active suicidal ideation, plan or intent and has protective factors of stable housing, close relationships with her children and engagement in healthcare.

## 2023-02-09 NOTE — DISCUSSION/SUMMARY
[Plan Review] : Plan Review [Able to manage surrounding demands and opportunities] : able to manage surrounding demands and opportunities [Able to set and pursue goals] : able to set and pursue goals [Motivated to participate in treatment] : motivated to participate in treatment [In good health] : in good health [Housing stability] : housing stability [English fluency] : English fluency [Connected to healthcare] : connected to healthcare [Initial] : Initial [___ times a week] : [unfilled] times a week [Improvement in symptoms as evidenced by:] : Improvement in symptoms as evidenced by: [None - Reason others did not participate:] : None - Reason others did not participate:  [Psychiatric Provider/Prescriber] : Psychiatric Provider/Prescriber [Mental Health] : Mental Health [Continued - Progress made] : Continued - Progress made: [every ___ months] : every [unfilled] months [FreeTextEntry2] : 8/9/23 [FreeTextEntry8] : Feelings of hopelessness might derail treatment, feelings of fatigue and overwhelm can also lead to avoidance for the patient. [FreeTextEntry9] : Patient is [place] and is a  immigrant, but identifies as American. [FreeTextEntry1] : Patient would like to maintain a comfortable living space to facilitate socialization [FreeTextEntry4] : "I want to get the apartment to a state when I can not panic when someone wants to come over" [de-identified] : Patient struggles with hoarding [de-identified] : Patient will shred all seven boxes of papers she has in her home currently [de-identified] : 2/4/23 [de-identified] : Patient shredded all the boxes mentioned.  Patient will continue to sort through her papers to find what can be discarded versus what needs to be saved. Patient commits to organizing and disposing of all of her papers in the next six months. [de-identified] : Patient will organize and dispose of half of her clothing [de-identified] : 2/4/23 [FreeTextEntry5] : Patient reports making significant progress in disposing of papers and clothing, but identifies there is still work left to do in this area. [de-identified] : Pankaj Dhaliwal MD [de-identified] : Sanjay Solis, PREETR [de-identified] : Patient will report feeling excited to invite people over to her home [de-identified] : not applicable [de-identified] : Pankaj Dhaliwal MD [Unknown] : Unknown [No] : No [Yes: Details:___] : Yes: [unfilled] [Completed, copy requested] : Completed, copy requested [Ordered] : Ordered [Yes] : Yes [Positive] : Positive [Vaccinated?] : is vaccinated [Date of last vaccination (mm/dd/yy):___] : date of last vaccination: [unfilled] [Education provided about COVID-19?] : Education provided about COVID-19 [Does patient use tobacco products?] : Patient does not use tobacco products [Does patient use medical marijuana?] : Patient does not use medical marijuana. [Patient has been tested for HIV?] : Patient has not been tested for HIV [Patient has been tested for Hepatitis?] : Patient has not been tested for hepatitis [Patient would like to be tested/re-tested?] : patient would not like to be tested/re-tested [Current or past COVID-19 diagnosis?] : Patient does not have a current or past COVID-19 diagnosis [de-identified] : unknown

## 2023-02-16 ENCOUNTER — APPOINTMENT (OUTPATIENT)
Dept: PSYCHIATRY | Facility: CLINIC | Age: 68
End: 2023-02-16

## 2023-02-23 ENCOUNTER — APPOINTMENT (OUTPATIENT)
Dept: PSYCHIATRY | Facility: CLINIC | Age: 68
End: 2023-02-23
Payer: MEDICARE

## 2023-02-23 PROCEDURE — 90834 PSYTX W PT 45 MINUTES: CPT | Mod: 95

## 2023-02-23 NOTE — PLAN
[FreeTextEntry2] : Patient receiving psychotherapy to address symptoms of anxiety and depression related to family stressors [Integrative Therapy] : Integrative Therapy  [de-identified] : Patient presented to session on time.  Patient discussed a recent conflict she had with her  which led to her leaving their home for the past week.  Patient explained that her  was making disparaging comments about women in general and patient did not want to stay in the home where the argument could escalate so she went to stay in her daughter's apartment nearby which was vacant for the week.  This writer explored patient's thoughts and feelings and offered emotional support.  Session ended with patient emotionally stable and behaviorally in control. [Return in ____ week(s)] : Return in [unfilled] week(s) [FreeTextEntry1] : 1x/weekly psychotherapy and regular psychiatric treatment

## 2023-02-28 ENCOUNTER — APPOINTMENT (OUTPATIENT)
Dept: PSYCHIATRY | Facility: CLINIC | Age: 68
End: 2023-02-28
Payer: MEDICARE

## 2023-02-28 PROCEDURE — 90834 PSYTX W PT 45 MINUTES: CPT | Mod: 95

## 2023-02-28 PROCEDURE — 99214 OFFICE O/P EST MOD 30 MIN: CPT | Mod: 95

## 2023-02-28 NOTE — DISCUSSION/SUMMARY
[FreeTextEntry1] : Miladys is a 66 year old  domiciled White Female, immigrant from [facility] since [facility], retired,  domiciled with  and adult son, with history of depression and anxiety symptoms. Patient has history of requiring up to diazepam 15 tablets/ month in times of higher stress, however has largely been able to manage with 10 pills/ months and continued to encourage to strengthen use coping skills vs Valium as anxiety is largely surrounding psychosocial conflict with her . She reports needing 10mg instead of 5mg of Valium in order to feel the effect lately.  She has hx of disrupted sleep patterns of staying up late at night to watch TV as she does not get a lot of self-care time during the day.  Patient advised of benefits of behavioral activation techniques for self-care and is motivated to increase activity level. \par \par I reviewed 8/16/22 labs sent by pt  ABNL TG  WNL CBC; CMP; lipids; HA1C; TSH 2.69\par \par plan to further evaluate possibility of taper off of WB.  Possible addition of other meds for SUKHI. (buspirone, pre-gabalin)\par \par I re-discussed plan to actively reduce diazepam prns and to increase activity and socialization.  \par \par Date of Last Physical Exam: 8/16/23\par Date of Last Annual Labs:  8/16/23\par Annual Review of Systems Completed (Y/N): at next appt\par Tobacco Screening Completed (Y/N): Quit smoking 2005\par \par If Patient is on an antipsychotic:  N/A\par Date of Last AIMS:\par Date of last BMI:\par Date of Last HbgA1c:\par Date of last Lipid Profile:\par \par PLAN:\par continue escitalopram  20 mg po daily\par continue Wellbutrin  mg po daily\par continue Valium 5 mg po PRN nightly, #5 (has #8 at this time)\par continue OTC Melatonin 5mg PO PRN\par next wt 1/2024\par next annual PE 8/2022\par weekly 1:1 w LEENA GayW\par RT 6 wks\par \par

## 2023-02-28 NOTE — SOCIAL HISTORY
[Lives with Spouse] : lives with spouse [With Family] : lives with family [Unemployed] : unemployed [] :  [# Of Children ___] : has [unfilled] children [College] : College [None] : none [No Known Substance Use] : no known substance use [FreeTextEntry1] :  [place] to [place] 16 from [place];  28 years; Pre-COVID worked 3 jobs. Reports that the deaths of her mom and her brother in  and  were huge setbacks for her.    Ongoing marital issues with her .   is in tx at [facility] - he has ADD.  His [employer] business closed 8 yrs ago and pandemic was negative. Marriage deteriorated after birth of first child/living next to in-laws.    Dtr 31 has intellectual disability - lives on own but texts pt constantly.  Son is 25 - fibromatosis (has had major surgery) and unemployed. Mother

## 2023-02-28 NOTE — PLAN
[FreeTextEntry2] : Patient receiving psychotherapy to address symptoms of anxiety and depression related to family stressors [Integrative Therapy] : Integrative Therapy  [de-identified] : Patient presented to session on time.  Patient discussed her recent conflict with her  and explored her feelings about his misogynist beliefs and their impact on their marriage.  Patient and this writer explored her thoughts and feelings and this writer offered emotional support.  Patient tisha connections to their early courtship and their families of origin.  Session ended with patient emotionally stable and behaviorally in control. [Return in ____ week(s)] : Return in [unfilled] week(s) [FreeTextEntry1] : 1x/weekly psychotherapy and regular psychiatric treatment

## 2023-02-28 NOTE — HISTORY OF PRESENT ILLNESS
[FreeTextEntry1] : Past 1 months:  "Ups and downs"   Had an argument w  a week ago - pt stayed at dtrs apt x 1 wk - was very helpful.  Used diazepam 10mg  - that was only time used the bzd.  Worry is more of an issue than depressed mood.  "The medicine has been very helpful - no SEs."      She reports sleep has been decreased due to staying up late to watch television, which she reports is her only time to enjoy time to herself.  Rarely goes out since pandemic.  Occas glass wine with dinner.  No cannabis.  \par  [FreeTextEntry2] : 999 - h/o sertraline (inefficacious and sexual dysfunction)\par 2008 - wellbutrin started - helped w depression\par approx 2013 - escitalopram added to WB \par \par \par

## 2023-03-07 ENCOUNTER — APPOINTMENT (OUTPATIENT)
Dept: PSYCHIATRY | Facility: CLINIC | Age: 68
End: 2023-03-07
Payer: MEDICARE

## 2023-03-07 PROCEDURE — 90834 PSYTX W PT 45 MINUTES: CPT | Mod: 95

## 2023-03-07 NOTE — PLAN
[FreeTextEntry2] : Patient receiving psychotherapy to address symptoms of anxiety and depression related to family stressors [Integrative Therapy] : Integrative Therapy  [de-identified] : Patient presented to session on time.  Patient discussed her frustrations with her  and children and this writer offered emotional support.  Patient reported that she is maintaining a sleep schedule that keeps her awake until the early morning hours and then asleep during the first half of the day.  This writer provided psychoeducation on sleep hygiene and helped patient think about her sleep as a form of self care.  Session ended with patient emotionally stable and behaviorally in control. [Return in ____ week(s)] : Return in [unfilled] week(s) [FreeTextEntry1] : 1x/weekly psychotherapy and regular psychiatric treatment

## 2023-03-14 ENCOUNTER — APPOINTMENT (OUTPATIENT)
Dept: PSYCHIATRY | Facility: CLINIC | Age: 68
End: 2023-03-14
Payer: MEDICARE

## 2023-03-14 PROCEDURE — 90834 PSYTX W PT 45 MINUTES: CPT | Mod: 95

## 2023-03-14 NOTE — PLAN
[FreeTextEntry2] : Patient receiving psychotherapy to address symptoms of anxiety and depression related to family stressors [Integrative Therapy] : Integrative Therapy  [de-identified] : Patient presented to session on time.  Patient discussed her gratitude for teletherapy, because of the increased convenience and ease of accessing care.  Patient discussed her thoughts about her 's concerns about his appearance due to aging and weight gain.  This writer explored patient's thoughts and feelings about his concerns and this writer offered emotional support.  Session ended with patient emotionally stable and behaviorally in control. [Return in ____ week(s)] : Return in [unfilled] week(s) [FreeTextEntry1] : 1x/weekly psychotherapy and regular psychiatric treatment

## 2023-03-21 ENCOUNTER — APPOINTMENT (OUTPATIENT)
Dept: PSYCHIATRY | Facility: CLINIC | Age: 68
End: 2023-03-21
Payer: MEDICARE

## 2023-03-21 PROCEDURE — 90834 PSYTX W PT 45 MINUTES: CPT | Mod: 95

## 2023-03-21 NOTE — END OF VISIT
[Duration of Psychotherapy Visit (minutes spent in synchronous communication): ____] : Duration of Psychotherapy Visit (minutes spent in synchronous communication): [unfilled] [Individual Psychotherapy for 38-52 minutes] : Individual Psychotherapy for 38 - 52 minutes [FreeTextEntry3] : [place] [Teletherapy Service Provided] : The services provided in this session were delivered via tele-therapy [FreeTextEntry5] : DANNA Ortiz [Licensed Clinician] : Licensed Clinician

## 2023-03-21 NOTE — PLAN
[FreeTextEntry2] : Patient receiving psychotherapy to address symptoms of anxiety and depression related to family stressors [Integrative Therapy] : Integrative Therapy  [de-identified] : Patient presented to session on time.  Patient discussed her feeling of waiting for something to change in her life and disappointment that nothing seems to happen.  Patient and this writer explored these feelings and tisha connections to her daughter's developmental challenges, her son's unemployment and her feeling of being alone in her marriage.  This writer offered emotional support and session ended with patient emotionally stable and behaviorally in control. [Return in ____ week(s)] : Return in [unfilled] week(s) [FreeTextEntry1] : 1x/weekly psychotherapy and regular psychiatric treatment

## 2023-03-28 ENCOUNTER — APPOINTMENT (OUTPATIENT)
Dept: PSYCHIATRY | Facility: CLINIC | Age: 68
End: 2023-03-28
Payer: MEDICARE

## 2023-03-28 PROCEDURE — 90834 PSYTX W PT 45 MINUTES: CPT | Mod: 95

## 2023-03-28 NOTE — PLAN
[FreeTextEntry2] : Patient receiving psychotherapy to address symptoms of anxiety and depression related to family stressors [Integrative Therapy] : Integrative Therapy  [de-identified] : Patient presented to session on time.  Patient discussed her feelings about her children and their future once she has passed away.  Patient and this writer explored her thoughts and feelings about how her children might be able or unable to support each other, how she can find services that could close the gap and her hopes and fears about her son's transition to adulthood.  This writer explored these thoughts and feelings and offered emotional support.  Session ended with patient emotionally stable and behaviorally in control. [Return in ____ week(s)] : Return in [unfilled] week(s) [FreeTextEntry1] : 1x/weekly psychotherapy and regular psychiatric treatment

## 2023-04-04 ENCOUNTER — APPOINTMENT (OUTPATIENT)
Dept: PSYCHIATRY | Facility: CLINIC | Age: 68
End: 2023-04-04

## 2023-04-11 ENCOUNTER — APPOINTMENT (OUTPATIENT)
Dept: PSYCHIATRY | Facility: CLINIC | Age: 68
End: 2023-04-11

## 2023-04-13 ENCOUNTER — APPOINTMENT (OUTPATIENT)
Dept: PSYCHIATRY | Facility: CLINIC | Age: 68
End: 2023-04-13
Payer: MEDICARE

## 2023-04-13 PROCEDURE — 99214 OFFICE O/P EST MOD 30 MIN: CPT

## 2023-04-13 NOTE — PHYSICAL EXAM
[Average] : average [Cooperative] : cooperative [Euthymic] : euthymic [Full] : full [Clear] : clear [Linear/Goal Directed] : linear/goal directed [None] : none [WNL] : within normal limits

## 2023-04-18 ENCOUNTER — APPOINTMENT (OUTPATIENT)
Dept: PSYCHIATRY | Facility: CLINIC | Age: 68
End: 2023-04-18
Payer: MEDICARE

## 2023-04-18 PROCEDURE — 90834 PSYTX W PT 45 MINUTES: CPT | Mod: 95

## 2023-04-18 NOTE — PHYSICAL EXAM
[Average] : average [Cooperative] : cooperative [Full] : full [Euthymic] : euthymic [Clear] : clear [Linear/Goal Directed] : linear/goal directed [WNL] : within normal limits

## 2023-04-18 NOTE — PLAN
[FreeTextEntry2] : Patient receiving psychotherapy to address symptoms of anxiety and depression related to family stressors [Integrative Therapy] : Integrative Therapy  [de-identified] : Patient presented to session on time.  Patient discussed her feelings about her recent visit with her psychiatrist and his advice on her marriage.  Patient and this writer explored her thoughts and feelings about the marriage and her recent frustrations with her 's self-focused worry.  This writer offered emotional support and explored different ways to communicate her feelings with her .  Session ended with patient emotionally stable and behaviorally in control. [Return in ____ week(s)] : Return in [unfilled] week(s) [FreeTextEntry1] : 1x/weekly psychotherapy and regular psychiatric treatment

## 2023-04-20 ENCOUNTER — APPOINTMENT (OUTPATIENT)
Dept: PSYCHIATRY | Facility: CLINIC | Age: 68
End: 2023-04-20

## 2023-05-01 ENCOUNTER — OUTPATIENT (OUTPATIENT)
Dept: OUTPATIENT SERVICES | Facility: HOSPITAL | Age: 68
LOS: 1 days | Discharge: HOME | End: 2023-05-01

## 2023-05-02 ENCOUNTER — APPOINTMENT (OUTPATIENT)
Dept: PSYCHIATRY | Facility: CLINIC | Age: 68
End: 2023-05-02
Payer: MEDICARE

## 2023-05-02 PROCEDURE — 90834 PSYTX W PT 45 MINUTES: CPT | Mod: 95

## 2023-05-09 ENCOUNTER — APPOINTMENT (OUTPATIENT)
Dept: PSYCHIATRY | Facility: CLINIC | Age: 68
End: 2023-05-09
Payer: MEDICARE

## 2023-05-09 PROCEDURE — 90834 PSYTX W PT 45 MINUTES: CPT | Mod: 95

## 2023-05-09 NOTE — PLAN
[FreeTextEntry2] : Patient receiving psychotherapy to address symptoms of anxiety and depression related to family stressors [Integrative Therapy] : Integrative Therapy  [de-identified] : Patient presented to session on time.  Patient discussed her concerns about her children's wellbeing after her death and her own feelings of grief and loneliness resulting from the passing of her parents and brother.  This writer explored these thoughts and feelings with the patient and offered emotional support and encouragement.  Session ended with patient emotionally stable and behaviorally in control. [Return in ____ week(s)] : Return in [unfilled] week(s) [FreeTextEntry1] : 1x/weekly psychotherapy and regular psychiatric treatment

## 2023-05-16 ENCOUNTER — APPOINTMENT (OUTPATIENT)
Dept: PSYCHIATRY | Facility: CLINIC | Age: 68
End: 2023-05-16
Payer: MEDICARE

## 2023-05-16 PROCEDURE — 90834 PSYTX W PT 45 MINUTES: CPT | Mod: 95

## 2023-05-23 ENCOUNTER — APPOINTMENT (OUTPATIENT)
Dept: PSYCHIATRY | Facility: CLINIC | Age: 68
End: 2023-05-23

## 2023-05-24 ENCOUNTER — APPOINTMENT (OUTPATIENT)
Dept: PSYCHIATRY | Facility: CLINIC | Age: 68
End: 2023-05-24
Payer: MEDICARE

## 2023-05-24 PROCEDURE — 99214 OFFICE O/P EST MOD 30 MIN: CPT | Mod: 95

## 2023-05-24 NOTE — SOCIAL HISTORY
[Lives with Spouse] : lives with spouse [With Family] : lives with family [Unemployed] : unemployed [] :  [# Of Children ___] : has [unfilled] children [College] : College [None] : none [No Known Substance Use] : no known substance use [FreeTextEntry1] :  [place] to [place] 16 from [place];  28 years; Pre-COVID worked 3 jobs. Reports that the deaths of her mom and her brother in  and  were huge setbacks for her.    Ongoing marital issues with her .   is in tx at [facility] - he has ADD.  His [employer] business closed 8 yrs ago and pandemic was negative. Marriage deteriorated after birth of first child/living next to in-laws.    Dtr 31 has intellectual disability - lives on own but texts pt constantly.  Son is 25 - fibromatosis (has had major surgery) and unemployed. Mother

## 2023-05-24 NOTE — DISCUSSION/SUMMARY
[FreeTextEntry1] : Miladys is a 66 year old  domiciled White Female, immigrant from [facility] since 1971, retired,  domiciled with  and adult son, with history of depression and anxiety symptoms. Patient has history of requiring up to diazepam 15 tablets/ month in times of higher stress, however has largely been able to manage with 10 pills/ months and continued to encourage to strengthen use coping skills vs Valium as anxiety is largely surrounding psychosocial conflict with her . She reports needing 10mg instead of 5mg of Valium in order to feel the effect lately.  She has hx of disrupted sleep patterns of staying up late at night to watch TV as she does not get a lot of self-care time during the day.  Patient advised of benefits of behavioral activation techniques for self-care and is motivated to increase activity level.  I re-discussed plan to actively reduce diazepam prns and to increase activity and socialization.  \par \par defer - plan to further evaluate possibility of taper off of WB.  Possible addition of other meds for SUKHI. (buspirone, pre-gabalin)\par \par We discussed possible use of MOCA to test memory - pt declines at this time.\par \par Will find something "just for her."  ie more jewelry making.\par \par Date of Last Physical Exam: 8/16/22\par Date of Last Annual Labs:  8/16/22\par Annual Review of Systems Completed (Y/N): at next appt\par Tobacco Screening Completed (Y/N): Quit smoking 2005\par \par PLAN:\par continue escitalopram  20 mg po daily\par continue Wellbutrin  mg po daily\par continue Valium 5 mg po PRN nightly, #5 (has #8 at this time)\par continue OTC Melatonin 5mg PO PRN\par next wt 1/2024\par next annual PE 8/2023\par weekly 1:1 w Jose Solis LCSW\par RT 6 wks\par \par

## 2023-05-24 NOTE — REASON FOR VISIT
[Other Location: e.g. Home (Enter Location, City,State)___] : The provider was located at [unfilled]. [Home] : The patient, [unfilled], was located at home, [unfilled], at the time of the visit. [Patient] : Patient [FreeTextEntry2] : 4/13/2023 [FreeTextEntry1] : med management

## 2023-05-24 NOTE — HISTORY OF PRESENT ILLNESS
[FreeTextEntry1] : Seen in person.  Past 6 wks:   Has used diazepam in dealing with 's complaints.   "It is never my turn."  Avoiding socialization as a result. Worry is more of an issue than depressed mood.  Memory has declined - "very slowly" - "like eye sight."  Rarely goes out since pandemic.  Pt/ have excessive clutter.  Occas glass wine with dinner.  No cannabis.  \par       [FreeTextEntry2] : h/o sertraline (inefficacious and sexual dysfunction)\par 2008 - wellbutrin started - helped w depression\par approx 2013 - escitalopram added to WB \par \par \par

## 2023-05-25 NOTE — REASON FOR VISIT
[Telehealth (audio & video) - Individual/Group] : This visit was provided via telehealth using real-time 2-way audio visual technology. [Other Location: e.g. Home (Enter Location, City,State)___] : The provider was located at [unfilled]. [Home] : The patient, [unfilled], was located at home, [unfilled], at the time of the visit. [Verbal consent obtained from patient/other participant(s)] : Verbal consent for telehealth/telephonic services obtained from patient/other participant(s) [Patient] : Patient [FreeTextEntry2] : 4/13/2023 [FreeTextEntry1] : med management

## 2023-05-25 NOTE — PAST MEDICAL HISTORY
[FreeTextEntry1] : Last annual PE august 16 2022 with Dr. Jeffrey UGARTE.   Memory has declined - "very slowly" - "like eye sight."

## 2023-05-25 NOTE — DISCUSSION/SUMMARY
[FreeTextEntry1] : Miladys is a 66 year old  domiciled White Female, immigrant from [facility] since 1971, retired,  domiciled with  and adult son, with history of depression and anxiety symptoms. Patient has history of requiring up to diazepam 15 tablets/ month in times of higher stress, however has largely been able to manage with 10 pills/ months and continued to encourage to strengthen use coping skills vs Valium as anxiety is largely surrounding psychosocial conflict with her . She reports needing 10mg instead of 5mg of Valium in order to feel the effect lately.  She has hx of disrupted sleep patterns of staying up late at night to watch TV as she does not get a lot of self-care time during the day.  Patient advised of benefits of behavioral activation techniques for self-care and is motivated to increase activity level.  I re-discussed plan to actively reduce diazepam prns and to increase activity and socialization.  \par \par defer - plan to further evaluate possibility of taper off of WB.  Possible addition of other meds for SUKHI. (buspirone, pre-gabalin)\par \par Will continue to find something "just for her."  ie friends, museums, [employer] making.\par \par Dicussed need to limit prns to 5 mg rather than 10 mg and to use self care as a way of limiting need for prns.  Does not drive. \par \[place] to be reviewed at next appt:\par 3/20/23 -  oxycodone 10 mg tabs x 10\par 10/24/22 - 11/22/22 -  oxycodone 10 mg tabs x 30 \par 8/21/22 - 8/30/22 -  oxycodone 10 mg tabs x 30 \par \par \par Date of Last Physical Exam: 8/16/22\par Date of Last Annual Labs:  8/16/22\par Annual Review of Systems Completed (Y/N): at next appt\par Tobacco Screening Completed (Y/N): Quit smoking 2005\par \par PLAN:\par continue escitalopram  20 mg po daily\par continue Wellbutrin  mg po daily\par continue Valium 5 mg po PRN nightly, #10 (has #2 at this time)\par continue OTC Melatonin 5mg PO PRN\par next wt 1/2024\par next annual PE 8/2023\par weekly 1:1 w Jose Solis LCSW\par RT 6 wks\par \par

## 2023-05-25 NOTE — SOCIAL HISTORY
[Lives with Spouse] : lives with spouse [With Family] : lives with family [Unemployed] : unemployed [] :  [# Of Children ___] : has [unfilled] children [College] : College [None] : none [No Known Substance Use] : no known substance use [FreeTextEntry1] : Imm[place] to [place] 16 from [place];  28 years; Pre-COVID worked 3 jobs. Reports that the deaths of her mom and her brother in  and  were huge setbacks for her.    Ongoing marital issues with her .   is in tx at [facility] - he has ADD.  His [employer] business closed 8 yrs ago and pandemic was negative. Marriage deteriorated after birth of first child/living next to in-laws.    Dtr 31 has intellectual disability - lives on own but texts pt constantly.  Son is 25 - fibromatosis (has had major surgery) and unemployed. Mother

## 2023-05-25 NOTE — HISTORY OF PRESENT ILLNESS
[FreeTextEntry1] : Past 6 wks:  Anxious about son having surgery for a pilonidal cyst - reminding pt of onset of son's fibromatosis.  Upset that missed a yeast infection in dtr.  Takes diazepam 10 mg rather than 5 mg - "the 5 doesn't do anything anymore."   Got together w two different friends - lunch and a museum w great difficulty.  Very much enjoyed self.  Looking into  again.  Worry is more of an issue than depressed mood.   is trying to care for self more.  Occas glass wine with dinner.  No cannabis.  \par       [FreeTextEntry2] : h/o sertraline (inefficacious and sexual dysfunction)\par 2008 - wellbutrin started - helped w depression\par approx 2013 - escitalopram added to WB \par \par \par

## 2023-05-30 ENCOUNTER — APPOINTMENT (OUTPATIENT)
Dept: PSYCHIATRY | Facility: CLINIC | Age: 68
End: 2023-05-30
Payer: MEDICARE

## 2023-05-30 PROCEDURE — 90834 PSYTX W PT 45 MINUTES: CPT | Mod: 95

## 2023-05-30 NOTE — PLAN
[FreeTextEntry2] : Patient receiving psychotherapy to address symptoms of anxiety and depression related to family stressors [Integrative Therapy] : Integrative Therapy  [de-identified] : Patient presented to session on time.  Patient discussed her concerns about her son's health and her frustrations with her .  Patient and this writer explored their interpersonal dynamic and this writer helped patient think of new ways to conduct herself in their relationship that would be congruent with her integrity and her wishes.  This writer offered emotional support and session ended with patient emotionally stable and behaviorally in control. [Return in ____ week(s)] : Return in [unfilled] week(s) [FreeTextEntry1] : 1x/weekly psychotherapy and regular psychiatric treatment

## 2023-06-06 ENCOUNTER — APPOINTMENT (OUTPATIENT)
Dept: PSYCHIATRY | Facility: CLINIC | Age: 68
End: 2023-06-06
Payer: MEDICARE

## 2023-06-06 PROCEDURE — 90834 PSYTX W PT 45 MINUTES: CPT | Mod: 95

## 2023-06-06 NOTE — PLAN
[FreeTextEntry2] : Patient receiving psychotherapy to address symptoms of anxiety and depression related to family stressors [Integrative Therapy] : Integrative Therapy  [de-identified] : Patient presented to session on time.  Patient discussed her concerns about her 's behavior, specifically about him not remembering things that are important to her and not cleaning up after himself.  This writer explored these thoughts and feelings and tisha connections to the early stages of their relationship and the shift in their marital dynamic after children.  Patient and this writer considered the possible benefits of their conflicts and the meaning this dynamic has in relation to their families of origin and culture.  Session ended with patient emotionally stable and behaviorally in control. [Return in ____ week(s)] : Return in [unfilled] week(s) [FreeTextEntry1] : 1x/weekly psychotherapy and regular psychiatric treatment

## 2023-06-12 DIAGNOSIS — F20.9 SCHIZOPHRENIA, UNSPECIFIED: ICD-10-CM

## 2023-06-13 ENCOUNTER — APPOINTMENT (OUTPATIENT)
Dept: PSYCHIATRY | Facility: CLINIC | Age: 68
End: 2023-06-13
Payer: MEDICARE

## 2023-06-13 PROCEDURE — 90834 PSYTX W PT 45 MINUTES: CPT | Mod: 95

## 2023-06-13 NOTE — PLAN
[FreeTextEntry2] : Patient receiving psychotherapy to address symptoms of anxiety and depression related to family stressors [Integrative Therapy] : Integrative Therapy  [de-identified] : Patient presented to session on time.  Patient discussed her anxiety about her daughter's rash and the need to complete a second biopsy.  Patient and this writer explored her relationship to her children's health and how it impacts her mental life.  This writer helped patient draw connections to her family of origin and offered emotional support and encouragement.  Session ended with patient emotionally stable and behaviorally in control. [Return in ____ week(s)] : Return in [unfilled] week(s) [FreeTextEntry1] : 1x/weekly psychotherapy and regular psychiatric treatment

## 2023-06-13 NOTE — END OF VISIT
[Duration of Psychotherapy Visit (minutes spent in synchronous communication): ____] : Duration of Psychotherapy Visit (minutes spent in synchronous communication): [unfilled] [Individual Psychotherapy for 38-52 minutes] : Individual Psychotherapy for 38 - 52 minutes [Teletherapy Service Provided] : The services provided in this session were delivered via tele-therapy [FreeTextEntry3] : [place] [Licensed Clinician] : Licensed Clinician [FreeTextEntry5] : DANNA Ortiz

## 2023-06-20 ENCOUNTER — APPOINTMENT (OUTPATIENT)
Dept: PSYCHIATRY | Facility: CLINIC | Age: 68
End: 2023-06-20
Payer: MEDICARE

## 2023-06-20 PROCEDURE — 90834 PSYTX W PT 45 MINUTES: CPT | Mod: 95

## 2023-06-20 NOTE — PLAN
[FreeTextEntry2] : Patient receiving psychotherapy to address symptoms of anxiety and depression related to family stressors [Integrative Therapy] : Integrative Therapy  [de-identified] : Patient presented to session on time.  Patient discussed her concerns with her daughter's health and her guilt about not being able to prevent her most recent condition form developing.  Patient and this writer explored her thoughts and feelings about this illness and examined her beliefs about her responsibility as a parent.  This writer offered emotional support and encouragement and session ended with patient emotionally stable and behaviorally in control. [Return in ____ week(s)] : Return in [unfilled] week(s) [FreeTextEntry1] : 1x/weekly psychotherapy and regular psychiatric treatment

## 2023-06-20 NOTE — REASON FOR VISIT
3 [Patient] : Patient [FreeTextEntry1] : Anxiety and depression related to martial and family stressors

## 2023-06-22 ENCOUNTER — APPOINTMENT (OUTPATIENT)
Dept: PSYCHIATRY | Facility: CLINIC | Age: 68
End: 2023-06-22

## 2023-06-27 ENCOUNTER — APPOINTMENT (OUTPATIENT)
Dept: PSYCHIATRY | Facility: CLINIC | Age: 68
End: 2023-06-27
Payer: MEDICARE

## 2023-06-27 PROCEDURE — 90834 PSYTX W PT 45 MINUTES: CPT | Mod: 95

## 2023-06-27 NOTE — PLAN
[FreeTextEntry2] : Patient receiving psychotherapy to address symptoms of anxiety and depression related to family stressors [Integrative Therapy] : Integrative Therapy  [de-identified] : Patient presented to session on time.  Patient discussed her daughters diagnosis and her feelings about her daughter socializing in her community.  Patient and this writer explored patient's thoughts and feelings about another potentially chronic medical condition, and her daughter's success in becoming a central figure in her social group.  This writer offered emotional support and encouragement and session ended with patient emotionally stable and behaviorally in control. [Return in ____ week(s)] : Return in [unfilled] week(s) [FreeTextEntry1] : 1x/weekly psychotherapy and regular psychiatric treatment

## 2023-07-07 ENCOUNTER — APPOINTMENT (OUTPATIENT)
Dept: PSYCHIATRY | Facility: CLINIC | Age: 68
End: 2023-07-07
Payer: MEDICARE

## 2023-07-07 PROCEDURE — 99214 OFFICE O/P EST MOD 30 MIN: CPT | Mod: 95

## 2023-07-07 NOTE — HISTORY OF PRESENT ILLNESS
[FreeTextEntry1] : Past 6 wks:  Stressed by 33 yo Dtr dx w rare condition lichen sclerosis.  In genital area.  Dtr has been living w pt.  Dtr improving.  Takes diazepam 5 rather than 10 mg prns.  Has increased getting together w friends.  Occas glass wine with dinner.  No cannabis.  \par \par [place]:\par 3/20/23 -  oxycodone 10 mg tabs x 10\par 10/24/22 - 11/22/22 -  oxycodone 10 mg tabs x 30 \par 8/21/22 - 8/30/22 -  oxycodone 10 mg tabs x 30 \par Has not been using opiates since April 2023.  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  12 implants were done.  Was using less diazepam due to the opiates.  Did not combine bzd and opiates.  Was aware that risk of resp depression w this combination.  In September bone grafts will be re-evaluated.       [FreeTextEntry2] : h/o sertraline (inefficacious and sexual dysfunction)\par 2008 - wellbutrin started - helped w depression\par approx 2013 - escitalopram added to WB \par \par \par

## 2023-07-07 NOTE — DISCUSSION/SUMMARY
[FreeTextEntry1] : Miladys is a 66 year old  domiciled White Female, immigrant from [facility] since [facility], retired,  domiciled with  and adult son, with history of depression and anxiety symptoms. Patient has history of requiring up to diazepam 15 tablets/ month in times of higher stress, however has largely been able to manage with 10 pills/ months and continued to encourage to strengthen use coping skills vs Valium as anxiety is largely surrounding psychosocial conflict with her . She reports needing 10mg instead of 5mg of Valium in order to feel the effect lately.  She has hx of disrupted sleep patterns of staying up late at night to watch TV as she does not get a lot of self-care time during the day.  Patient advised of benefits of behavioral activation techniques for self-care and is motivated to increase activity level.  I re-discussed plan to actively reduce diazepam prns and to increase activity and socialization.  Discussed need to limit prns to 5 mg rather than 10 mg and to use self care as a way of limiting need for prns.  Does not drive. \par \par defer - plan to further evaluate possibility of taper off of WB.  Possible addition of other meds for SUKHI. (buspirone, pre-gabalin)\par \par Will continue to find something "just for her."  ie friends, museums, [employer] making.\par \par Date of Last Physical Exam: 8/16/22\par Date of Last Annual Labs:  8/16/22\par Annual Review of Systems Completed (Y/N): at next appt\par Tobacco Screening Completed (Y/N): Quit smoking 2005\par \par PLAN:\par continue escitalopram  20 mg po daily\par continue Wellbutrin  mg po daily\par continue Valium 5 mg po PRN nightly, #10 (has #2 at this time)\par continue OTC Melatonin 5mg PO PRN\par next wt 1/2024\par next annual PE 8/2023\par weekly 1:1 w Jose Solis LCSW\par RT 6 wks\par \par

## 2023-07-07 NOTE — SOCIAL HISTORY
Full range of motion of upper and lower extremities, no joint tenderness/swelling. [Lives with Spouse] : lives with spouse [With Family] : lives with family [Unemployed] : unemployed [] :  [# Of Children ___] : has [unfilled] children [College] : College [None] : none [No Known Substance Use] : no known substance use [FreeTextEntry1] : [place] to [place] 16 from [place];  28 years; Pre-COVID worked 3 jobs. Reports that the deaths of her mom and her brother in  and  were huge setbacks for her.    Ongoing marital issues with her .   is in tx at [facility] - he has ADD.  His [employer] business closed 8 yrs ago and pandemic was negative. Marriage deteriorated after birth of first child/living next to in-laws.    Dtr 31 has intellectual disability - lives on own but texts pt constantly.  Son is 25 - fibromatosis (has had major surgery) and unemployed. Mother

## 2023-07-18 ENCOUNTER — APPOINTMENT (OUTPATIENT)
Dept: PSYCHIATRY | Facility: CLINIC | Age: 68
End: 2023-07-18
Payer: MEDICARE

## 2023-07-18 PROCEDURE — 90834 PSYTX W PT 45 MINUTES: CPT | Mod: 95

## 2023-07-18 NOTE — PLAN
[FreeTextEntry2] : Patient receiving psychotherapy to address symptoms of anxiety and depression related to family stressors [Integrative Therapy] : Integrative Therapy  [de-identified] : Patient presented to session on time.  Patient discussed her psychiatrist's concern about her opioid prescriptions.  Patient and this writer explored her prescription drug usage and patient explained that she received extensive dental work which required significant pain management in order to maintain adequate nutritional intake and quality of life.  Patient denied misusing the medications.  Patient discussed ongoing medical issues with her family members and an upcoming vacation they will take together.  This writer offered emotional support and session ended with patient emotionally stable and behaviorally in control. [Return in ____ week(s)] : Return in [unfilled] week(s) [FreeTextEntry1] : 1x/weekly psychotherapy and regular psychiatric treatment

## 2023-07-25 ENCOUNTER — APPOINTMENT (OUTPATIENT)
Dept: PSYCHIATRY | Facility: CLINIC | Age: 68
End: 2023-07-25
Payer: MEDICARE

## 2023-07-25 PROCEDURE — 90834 PSYTX W PT 45 MINUTES: CPT | Mod: 95

## 2023-07-25 NOTE — PLAN
[FreeTextEntry2] : Patient receiving psychotherapy to address symptoms of anxiety and depression related to family stressors [Integrative Therapy] : Integrative Therapy  [de-identified] : Patient presented to session on time.  Patient discussed her concerns with her daughter's future after she passes away and her 's relative lack of concern to help make arrangements.  Patient and this writer explored her thoughts and feelings and tisha connections to ongoing family tensions, marital conflict and her feelings of overwhelm and chronic worry.  This writer offered emotional support and encouragement and session ended with patient emotionally stable and behaviorally in control. [Return in ____ week(s)] : Return in [unfilled] week(s) [FreeTextEntry1] : 1x/weekly psychotherapy and regular psychiatric treatment

## 2023-08-01 ENCOUNTER — APPOINTMENT (OUTPATIENT)
Dept: PSYCHIATRY | Facility: CLINIC | Age: 68
End: 2023-08-01
Payer: MEDICARE

## 2023-08-01 PROCEDURE — 90834 PSYTX W PT 45 MINUTES: CPT | Mod: 95

## 2023-08-08 ENCOUNTER — APPOINTMENT (OUTPATIENT)
Dept: PSYCHIATRY | Facility: CLINIC | Age: 68
End: 2023-08-08

## 2023-08-15 ENCOUNTER — APPOINTMENT (OUTPATIENT)
Dept: PSYCHIATRY | Facility: CLINIC | Age: 68
End: 2023-08-15
Payer: MEDICARE

## 2023-08-15 PROCEDURE — 90834 PSYTX W PT 45 MINUTES: CPT | Mod: 95

## 2023-08-15 NOTE — PLAN
[FreeTextEntry2] : Patient receiving psychotherapy to address symptoms of anxiety and depression related to family stressors [Integrative Therapy] : Integrative Therapy  [de-identified] : Patient presented to session on time.  Patient discussed her feelings about her upcoming family vacation, the changes she is seeing in her son's level of motivation and her daughter's ongoing health concerns.  Patient and this writer explored her thoughts and feelings on these topics and tisha connections to her childhood experiences, marital conflicts and the progress they have made in resolving some of their previous marital conflicts.  This writer provided emotional support and encouragement and session ended with patient emotionally stable and behaviorally in control. [Return in ____ week(s)] : Return in [unfilled] week(s) [FreeTextEntry1] : 1x/weekly psychotherapy and regular psychiatric treatment

## 2023-08-15 NOTE — REASON FOR VISIT
[Patient preference] : as per patient preference [Telehealth (audio & video) - Individual/Group] : This visit was provided via telehealth using real-time 2-way audio visual technology. [Other Location: e.g. Home (Enter Location, City,State)___] : The provider was located at [unfilled]. [Home] : The patient, [unfilled], was located at home, [unfilled], at the time of the visit. [Verbal consent obtained from patient/other participant(s)] : Verbal consent for telehealth/telephonic services obtained from patient/other participant(s) [FreeTextEntry4] : 4:00pm [FreeTextEntry5] : 4:45pm [Patient] : Patient [FreeTextEntry1] : Anxiety and depression related to martial and family stressors

## 2023-08-18 ENCOUNTER — APPOINTMENT (OUTPATIENT)
Dept: PSYCHIATRY | Facility: CLINIC | Age: 68
End: 2023-08-18
Payer: MEDICARE

## 2023-08-18 PROCEDURE — 99214 OFFICE O/P EST MOD 30 MIN: CPT | Mod: 95

## 2023-08-18 NOTE — DISCUSSION/SUMMARY
[FreeTextEntry1] : Miladys is a 67 year old  domiciled White Female, immigrant from [facility] since [place], retired,  domiciled with  and adult son, with history of depression and anxiety symptoms. Patient has history of requiring up to diazepam 15 tablets/ month in times of higher stress, however has largely been able to manage with 10 pills/ months and continued to encourage to strengthen use coping skills vs Valium as anxiety is largely surrounding psychosocial conflict with her . She reports needing 10mg instead of 5mg of Valium in order to feel the effect lately.  She has hx of disrupted sleep patterns of staying up late at night to watch TV as she does not get a lot of self-care time during the day.  Patient advised of benefits of behavioral activation techniques for self-care and is motivated to increase activity level.  I re-discussed plan to actively reduce diazepam prns and to increase activity and socialization.  Discussed need to limit prns to 5 mg rather than 10 mg and to use self care as a way of limiting need for prns.  Does not drive.   defer - plan to further evaluate possibility of taper off of WB.  Possible addition of other meds for SUKHI. (buspirone, pre-gabalin)  Date of Last Physical Exam: 8/16/22 Date of Last Annual Labs:  8/16/22 Annual Review of Systems Completed (Y/N): at next appt Tobacco Screening Completed (Y/N): Quit smoking 2005  PLAN: continue escitalopram  20 mg po daily continue Wellbutrin  mg po daily continue Valium 5 mg po PRN nightly,  -  has #6 at this time continue OTC Melatonin 5mg PO PRN next wt 1/2024 next annual PE 8/2023 weekly 1:1 w LEENA GayW RT 2 mos

## 2023-08-18 NOTE — REASON FOR VISIT
[Telehealth (audio & video) - Individual/Group] : This visit was provided via telehealth using real-time 2-way audio visual technology. [Medical Office: ([facility])___] : The provider was located at the medical office in [unfilled]. [Home] : The patient, [unfilled], was located at home, [unfilled], at the time of the visit. [Verbal consent obtained from patient/other participant(s)] : Verbal consent for telehealth/telephonic services obtained from patient/other participant(s) [Patient] : Patient [FreeTextEntry2] : 4/13/2023 [FreeTextEntry1] : med management

## 2023-08-18 NOTE — PAST MEDICAL HISTORY
[FreeTextEntry1] : Last annual PE august 16 2022 with Dr. Murphy - [facility].   Memory has declined - "very slowly" - "like eye sight."  We discussed [facility]  record for opiates rx by dentist: 3/20/23 -  oxycodone 10 mg tabs x 10 10/24/22 - 11/22/22 -  oxycodone 10 mg tabs x 30  8/21/22 - 8/30/22 -  oxycodone 10 mg tabs x 30  Has not been using opiates since April 2023.  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  12 implants were done.  Was using less diazepam due to the opiates.  Did not combine bzd and opiates.  Was aware that risk of resp depression w this combination.  In September bone grafts will be re-evaluated.

## 2023-08-18 NOTE — SOCIAL HISTORY
[Lives with Spouse] : lives with spouse [With Family] : lives with family [Unemployed] : unemployed [] :  [# Of Children ___] : has [unfilled] children [College] : College [None] : none [No Known Substance Use] : no known substance use [FreeTextEntry1] : [place] to [place] 16 from [place];  28 years; Pre-COVID worked 3 jobs. Reports that the deaths of her mom and her brother in  and  were huge setbacks for her.    Ongoing marital issues with her .   is in tx at [facility] - he has ADD.  His [employer] business closed 8 yrs ago and pandemic was negative. Marriage deteriorated after birth of first child/living next to in-laws.    Dtr 31 has intellectual disability - lives on own but texts pt constantly.  Son is 25 - fibromatosis (has had major surgery) and unemployed. Mother

## 2023-08-18 NOTE — HISTORY OF PRESENT ILLNESS
[FreeTextEntry1] : Past 6 wks:  "I am overall ok."  Going on a cruise - will return 9/16.  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  In genital area.   Has increased getting together w friends.  Minimizing use of diazepam ... maybe used 4x. Less anxious partly related to improved financial conditions.   Occas glass wine with dinner.  No cannabis.  Has annual PE 10/2023.   [FreeTextEntry2] : h/o sertraline (inefficacious and sexual dysfunction)\par  2008 - wellbutrin started - helped w depression\par  approx 2013 - escitalopram added to WB \par  \par  \par

## 2023-08-22 ENCOUNTER — APPOINTMENT (OUTPATIENT)
Dept: PSYCHIATRY | Facility: CLINIC | Age: 68
End: 2023-08-22

## 2023-08-29 ENCOUNTER — APPOINTMENT (OUTPATIENT)
Dept: PSYCHIATRY | Facility: CLINIC | Age: 68
End: 2023-08-29

## 2023-09-05 ENCOUNTER — APPOINTMENT (OUTPATIENT)
Dept: PSYCHIATRY | Facility: CLINIC | Age: 68
End: 2023-09-05

## 2023-09-12 ENCOUNTER — APPOINTMENT (OUTPATIENT)
Dept: PSYCHIATRY | Facility: CLINIC | Age: 68
End: 2023-09-12

## 2023-09-19 ENCOUNTER — APPOINTMENT (OUTPATIENT)
Dept: PSYCHIATRY | Facility: CLINIC | Age: 68
End: 2023-09-19
Payer: MEDICARE

## 2023-09-19 PROCEDURE — 90834 PSYTX W PT 45 MINUTES: CPT | Mod: 95

## 2023-09-26 ENCOUNTER — APPOINTMENT (OUTPATIENT)
Dept: PSYCHIATRY | Facility: CLINIC | Age: 68
End: 2023-09-26

## 2023-09-30 NOTE — PLAN
[FreeTextEntry2] : Patient receiving psychotherapy to address symptoms of anxiety and depression related to family stressors [Integrative Therapy] : Integrative Therapy  [de-identified] : Patient presented to session on time.  Patient discussed her relationship with her children and her son's health.  Patient and this writer explored her thoughts and feelings about his recent psychological improvement and her concerns about an upcoming medical procedure he has scheduled.  This writer helped patient draw connections to her family of origin, her parent's relationships and the family culture.  This writer offered emotional support and session ended with patient emotionally stable and behaviorally in control. [Return in ____ week(s)] : Return in [unfilled] week(s) [FreeTextEntry1] : 1x/weekly psychotherapy and regular psychiatric treatment Negative

## 2023-10-03 ENCOUNTER — APPOINTMENT (OUTPATIENT)
Dept: PSYCHIATRY | Facility: CLINIC | Age: 68
End: 2023-10-03
Payer: MEDICARE

## 2023-10-03 PROCEDURE — 90834 PSYTX W PT 45 MINUTES: CPT | Mod: 95

## 2023-10-10 ENCOUNTER — APPOINTMENT (OUTPATIENT)
Dept: PSYCHIATRY | Facility: CLINIC | Age: 68
End: 2023-10-10

## 2023-10-13 ENCOUNTER — APPOINTMENT (OUTPATIENT)
Dept: PSYCHIATRY | Facility: CLINIC | Age: 68
End: 2023-10-13
Payer: MEDICARE

## 2023-10-13 PROCEDURE — 99215 OFFICE O/P EST HI 40 MIN: CPT | Mod: 95

## 2023-10-13 RX ORDER — DIAZEPAM 5 MG/1
5 TABLET ORAL
Qty: 10 | Refills: 0 | Status: DISCONTINUED | COMMUNITY
End: 2023-10-13

## 2023-10-17 ENCOUNTER — APPOINTMENT (OUTPATIENT)
Dept: PSYCHIATRY | Facility: CLINIC | Age: 68
End: 2023-10-17

## 2023-10-24 ENCOUNTER — APPOINTMENT (OUTPATIENT)
Dept: PSYCHIATRY | Facility: CLINIC | Age: 68
End: 2023-10-24

## 2023-10-31 ENCOUNTER — APPOINTMENT (OUTPATIENT)
Dept: PSYCHIATRY | Facility: CLINIC | Age: 68
End: 2023-10-31

## 2023-11-01 ENCOUNTER — OUTPATIENT (OUTPATIENT)
Dept: OUTPATIENT SERVICES | Facility: HOSPITAL | Age: 68
LOS: 1 days | Discharge: HOME | End: 2023-11-01

## 2023-11-07 ENCOUNTER — APPOINTMENT (OUTPATIENT)
Dept: PSYCHIATRY | Facility: CLINIC | Age: 68
End: 2023-11-07
Payer: MEDICARE

## 2023-11-07 PROCEDURE — 90834 PSYTX W PT 45 MINUTES: CPT | Mod: 95

## 2023-11-14 ENCOUNTER — APPOINTMENT (OUTPATIENT)
Dept: PSYCHIATRY | Facility: CLINIC | Age: 68
End: 2023-11-14
Payer: MEDICARE

## 2023-11-14 PROCEDURE — 90834 PSYTX W PT 45 MINUTES: CPT | Mod: 95

## 2023-11-21 ENCOUNTER — APPOINTMENT (OUTPATIENT)
Dept: PSYCHIATRY | Facility: CLINIC | Age: 68
End: 2023-11-21

## 2023-11-28 ENCOUNTER — APPOINTMENT (OUTPATIENT)
Dept: PSYCHIATRY | Facility: CLINIC | Age: 68
End: 2023-11-28
Payer: MEDICARE

## 2023-11-28 PROCEDURE — 90834 PSYTX W PT 45 MINUTES: CPT | Mod: 95

## 2023-12-05 ENCOUNTER — APPOINTMENT (OUTPATIENT)
Dept: PSYCHIATRY | Facility: CLINIC | Age: 68
End: 2023-12-05
Payer: MEDICARE

## 2023-12-05 PROCEDURE — 90834 PSYTX W PT 45 MINUTES: CPT | Mod: 95

## 2023-12-12 ENCOUNTER — APPOINTMENT (OUTPATIENT)
Dept: PSYCHIATRY | Facility: CLINIC | Age: 68
End: 2023-12-12
Payer: MEDICARE

## 2023-12-12 PROCEDURE — 90834 PSYTX W PT 45 MINUTES: CPT | Mod: 95

## 2023-12-19 ENCOUNTER — APPOINTMENT (OUTPATIENT)
Dept: PSYCHIATRY | Facility: CLINIC | Age: 68
End: 2023-12-19
Payer: MEDICARE

## 2023-12-19 PROCEDURE — 90834 PSYTX W PT 45 MINUTES: CPT | Mod: 95

## 2023-12-19 NOTE — PLAN
[FreeTextEntry2] : Patient receiving psychotherapy to address symptoms of anxiety and depression related to family stressors [Integrative Therapy] : Integrative Therapy  [de-identified] : Patient presented to session on time.  Patient discussed her difficulty with keeping her home clean and its relationship to her feelings about her .  Patient and this writer explored her thoughts and feelings about the situation and tisha connections to avoidance of anger, self-punishment and her relationship with her mother.  This writer offered emotional support and encouragement and session ended with patient emotionally stable and behaviorally in control. [Return in ____ week(s)] : Return in [unfilled] week(s) [FreeTextEntry1] : 1x/weekly psychotherapy and regular psychiatric treatment

## 2023-12-26 ENCOUNTER — APPOINTMENT (OUTPATIENT)
Dept: PSYCHIATRY | Facility: CLINIC | Age: 68
End: 2023-12-26

## 2024-01-02 ENCOUNTER — APPOINTMENT (OUTPATIENT)
Dept: PSYCHIATRY | Facility: CLINIC | Age: 69
End: 2024-01-02
Payer: MEDICARE

## 2024-01-02 PROCEDURE — 90834 PSYTX W PT 45 MINUTES: CPT | Mod: 95

## 2024-01-02 NOTE — PLAN
[FreeTextEntry2] : Patient receiving psychotherapy to address symptoms of anxiety and depression related to family stressors [Integrative Therapy] : Integrative Therapy  [de-identified] : Patient presented to session on time.  Patient discussed her wishes to renovate their apartment and the marital dynamics that get activated when she discusses this topic with her . This writer explored patient's thoughts and feelings on this topic and tisha connections to relevant family history and other family dynamics.  Patient reflected on the positive changes she has made over the past year and this writer offered validation.  Session ended with patient emotionally stable and behaviorally in control. [Return in ____ week(s)] : Return in [unfilled] week(s) [FreeTextEntry1] : 1x/weekly psychotherapy and regular psychiatric treatment

## 2024-01-05 ENCOUNTER — NON-APPOINTMENT (OUTPATIENT)
Age: 69
End: 2024-01-05

## 2024-01-05 ENCOUNTER — APPOINTMENT (OUTPATIENT)
Dept: PSYCHIATRY | Facility: CLINIC | Age: 69
End: 2024-01-05

## 2024-01-05 ENCOUNTER — APPOINTMENT (OUTPATIENT)
Dept: PSYCHIATRY | Facility: CLINIC | Age: 69
End: 2024-01-05
Payer: MEDICARE

## 2024-01-05 DIAGNOSIS — M85.80 OTHER SPECIFIED DISORDERS OF BONE DENSITY AND STRUCTURE, UNSPECIFIED SITE: ICD-10-CM

## 2024-01-05 PROCEDURE — 99215 OFFICE O/P EST HI 40 MIN: CPT

## 2024-01-05 RX ORDER — BUPROPION HYDROCHLORIDE 150 MG/1
150 TABLET, EXTENDED RELEASE ORAL
Qty: 90 | Refills: 0 | Status: DISCONTINUED | COMMUNITY
Start: 1900-01-01 | End: 2024-01-05

## 2024-01-05 NOTE — PAST MEDICAL HISTORY
[FreeTextEntry1] : Last annual PE august 16 2022 with Dr. Murphy - [facility].   Memory has declined - "very slowly" - "like eye sight."  We discussed [facility]  record for opiates rx by dentist: 3/20/23 -  oxycodone 10 mg tabs x 10 10/24/22 - 11/22/22 -  oxycodone 10 mg tabs x 30  8/21/22 - 8/30/22 -  oxycodone 10 mg tabs x 30  Has not been using opiates since April 2023.  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  12 implants were done.  Was using less diazepam due to the opiates.  Did not combine bzd and opiates.  Was aware that risk of resp depression w this combination.  In September bone grafts will be re-evaluated.

## 2024-01-05 NOTE — SOCIAL HISTORY
[FreeTextEntry1] : Immigrated to [place] at 16 from [place];  28 years; Pre-COVID worked 3 jobs. Reports that the deaths of her mom and her brother in  and  were huge setbacks for her.    Ongoing marital issues with her .   is in tx at [facility] - he has ADD.  They have tried couples tx several times.  His [employer] business closed 8 yrs ago and pandemic was negative. Marriage deteriorated after birth of first child/living next to in-laws.    Dtr 31 has intellectual disability - lives on own but texts pt constantly.  Son is 25 - fibromatosis (has had major surgery) and unemployed. Mother  .  They have a sexual relationship for past several years.

## 2024-01-05 NOTE — DISCUSSION/SUMMARY
[FreeTextEntry1] : Miladys is a 67 year old  domiciled White Female, immigrant from [facility] since [place], retired,  domiciled with  and adult son, with history of depression and anxiety symptoms. Patient has history of requiring up to diazepam 15 tablets/ month in times of higher stress, however has largely been able to manage with 10 pills/ months and continued to encourage to strengthen use coping skills vs Valium as anxiety is largely surrounding psychosocial conflict with her . She reports needing 10mg instead of 5mg of Valium in order to feel the effect lately.  She has hx of disrupted sleep patterns of staying up late at night to watch TV as she does not get a lot of self-care time during the day.  Patient advised of benefits of behavioral activation techniques for self-care and is motivated to increase activity level.  I re-discussed plan to actively reduce diazepam prns and to increase activity and socialization.  Discussed need to limit prns to 5 mg rather than 10 mg and to use self care as a way of limiting need for prns.  Does not drive.   10/3/23 labs Abnl - none WNL - CBC, CMP, HA1C, lipids, iron, TSH 2,6  On 1/5/2024 Discussed possible risk of osteoporosis with long term use of  ssris.   Had dexa bone scan 6/27/2022 - dx osteopenia.  Pt wants to continue at this time.  Discussed possibility of taper off of WB ... pt would like to stop.  Possible addition of other meds for SUKHI. (buspirone, pre-gabalin)  Date of Last Physical Exam: 10/3/2023 - Dr, Frankenburger - cardiologist - [facility]. Date of Last Annual Labs:  10/3/2023  Annual Review of Systems Completed (Y/N): at next appt Tobacco Screening Completed (Y/N): Quit smoking 2005  PLAN: continue escitalopram  20 mg po daily d/c Wellbutrin  mg po daily continue OTC Melatonin 5mg PO PRN next wt 1/2024 next annual PE 10/2024 weekly 1:1 w Jose Solis, LEENAW RT 3 mos

## 2024-01-05 NOTE — HISTORY OF PRESENT ILLNESS
[FreeTextEntry1] : In person at [facility].  Past 3 months:  "I am doing ok.  Has not used diazepam.  Stressed by 31 yo Dtr dx w rare condition lichen sclerosis.  She has IQ = 60.   Still getting together more w friends.  Involved more with [employer] since 9/2023 - "it's fun" and is earning income.  Occas glass wine with dinner.  No cannabis.  Has annual PE 10/2023 - Dr, Frankenburger - cardiologist - [facility].    [FreeTextEntry2] : h/o sertraline (inefficacious and sexual dysfunction)\par  2008 - wellbutrin started - helped w depression\par  approx 2013 - escitalopram added to WB \par  \par  \par

## 2024-01-09 ENCOUNTER — APPOINTMENT (OUTPATIENT)
Dept: PSYCHIATRY | Facility: CLINIC | Age: 69
End: 2024-01-09

## 2024-01-16 ENCOUNTER — APPOINTMENT (OUTPATIENT)
Dept: PSYCHIATRY | Facility: CLINIC | Age: 69
End: 2024-01-16
Payer: MEDICARE

## 2024-01-16 PROCEDURE — 90834 PSYTX W PT 45 MINUTES: CPT | Mod: 95

## 2024-01-16 NOTE — PLAN
[FreeTextEntry2] : Patient receiving psychotherapy to address symptoms of anxiety and depression related to family stressors [Integrative Therapy] : Integrative Therapy  [de-identified] : Patient presented to session on time.  Patient discussed her frustrations with her  and the way he communicates with her.  Patient and this writer explored her thoughts and feelings about her  and tisha connnections to his family or origin, their early relationship, their roles as care takers of their children and her feelings of being unappreciated.  This writer offered emotional support and encouragement and session ended with patient emotionally stable and behaviorally in control. [Return in ____ week(s)] : Return in [unfilled] week(s) [FreeTextEntry1] : 1x/weekly psychotherapy and regular psychiatric treatment

## 2024-01-23 ENCOUNTER — APPOINTMENT (OUTPATIENT)
Dept: PSYCHIATRY | Facility: CLINIC | Age: 69
End: 2024-01-23
Payer: MEDICARE

## 2024-01-23 PROCEDURE — 90834 PSYTX W PT 45 MINUTES: CPT | Mod: 95

## 2024-01-23 NOTE — PLAN
[Integrative Therapy] : Integrative Therapy  [FreeTextEntry2] : Patient receiving psychotherapy to address symptoms of anxiety and depression related to family stressors [Return in ____ week(s)] : Return in [unfilled] week(s) [de-identified] : Patient presented to session on time.  Patient discussed her frustrations with her  and the state of her home.  Patient and this writer explored her thoughts and feelings about the cleanliness of her home, her resistance to addressing it, and her feelings towards her .  This writer helped patient clarify her feelings about the home and worked to enhance motivation for change.  Session ended with patient emotionally stable and behaviorally in control. [FreeTextEntry1] : 1x/weekly psychotherapy and regular psychiatric treatment

## 2024-01-23 NOTE — REASON FOR VISIT
[Telehealth (audio & video) - Individual/Group] : This visit was provided via telehealth using real-time 2-way audio visual technology. [Patient preference] : as per patient preference [Home] : The patient, [unfilled], was located at home, [unfilled], at the time of the visit. [Other Location: e.g. Home (Enter Location, City,State)___] : The provider was located at [unfilled]. [Verbal consent obtained from patient/other participant(s)] : Verbal consent for telehealth/telephonic services obtained from patient/other participant(s) [FreeTextEntry4] : 4:00pm [FreeTextEntry5] : 4:45pm [FreeTextEntry1] : Anxiety and depression related to martial and family stressors [Patient] : Patient

## 2024-01-23 NOTE — END OF VISIT
[Duration of Psychotherapy Visit (minutes spent in synchronous communication): ____] : Duration of Psychotherapy Visit (minutes spent in synchronous communication): [unfilled] [Teletherapy Service Provided] : The services provided in this session were delivered via tele-therapy [Individual Psychotherapy for 38-52 minutes] : Individual Psychotherapy for 38 - 52 minutes [FreeTextEntry5] : DANNA Ortiz [FreeTextEntry3] : [place] [Licensed Clinician] : Licensed Clinician

## 2024-01-23 NOTE — PHYSICAL EXAM
[Average] : average [Cooperative] : cooperative [Clear] : clear [Euthymic] : euthymic [Full] : full [Linear/Goal Directed] : linear/goal directed [WNL] : within normal limits

## 2024-01-30 ENCOUNTER — APPOINTMENT (OUTPATIENT)
Dept: PSYCHIATRY | Facility: CLINIC | Age: 69
End: 2024-01-30
Payer: MEDICARE

## 2024-01-30 PROCEDURE — 90834 PSYTX W PT 45 MINUTES: CPT | Mod: 95

## 2024-01-30 NOTE — PLAN
[FreeTextEntry2] : Patient receiving psychotherapy to address symptoms of anxiety and depression related to family stressors [Integrative Therapy] : Integrative Therapy  [de-identified] : Patient presented to session on time.  Patient discussed her feelings about her peer's health struggles and her family's recent struggles and accomplishments.  Patient and this writer explored her thoughts and feelings on these topics and tisha connections to her family or origin, her fears about the future and the current state of her apartment.  This writer offered emotional support and encouragement and session ended with patient emotionally stable and behaviorally in control. [Return in ____ week(s)] : Return in [unfilled] week(s) [FreeTextEntry1] : 1x/weekly psychotherapy and regular psychiatric treatment

## 2024-02-06 ENCOUNTER — APPOINTMENT (OUTPATIENT)
Dept: PSYCHIATRY | Facility: CLINIC | Age: 69
End: 2024-02-06
Payer: MEDICARE

## 2024-02-06 PROCEDURE — 90834 PSYTX W PT 45 MINUTES: CPT | Mod: 95

## 2024-02-06 NOTE — PLAN
[FreeTextEntry2] : Patient receiving psychotherapy to address symptoms of anxiety and depression related to family stressors [Integrative Therapy] : Integrative Therapy  [de-identified] : Patient presented to session on time.  Patient discussed her family history, hopes for the future, and the death of her cousin.  Patient and this writer explored thoughts and feelings and tisha connections to her relationships with her children, her family history, her Moravian history and her wishes for the future.  This writer offered emotional support and encouragement and session ended with patient emotionally stable and behaviorally in control. [Return in ____ week(s)] : Return in [unfilled] week(s) [FreeTextEntry1] : 1x/weekly psychotherapy and regular psychiatric treatment

## 2024-02-08 DIAGNOSIS — F33.1 MAJOR DEPRESSIVE DISORDER, RECURRENT, MODERATE: ICD-10-CM

## 2024-02-13 ENCOUNTER — APPOINTMENT (OUTPATIENT)
Dept: PSYCHIATRY | Facility: CLINIC | Age: 69
End: 2024-02-13

## 2024-02-20 ENCOUNTER — APPOINTMENT (OUTPATIENT)
Dept: PSYCHIATRY | Facility: CLINIC | Age: 69
End: 2024-02-20
Payer: MEDICARE

## 2024-02-20 PROCEDURE — 90834 PSYTX W PT 45 MINUTES: CPT | Mod: 95

## 2024-02-20 NOTE — PLAN
[FreeTextEntry2] : Patient receiving psychotherapy to address symptoms of anxiety and depression related to family stressors [Integrative Therapy] : Integrative Therapy  [de-identified] : Patient presented to session on time.  Patient and this writer collaboratively completed patient's treatment plan review, see chart for details.  Session ended with patient emotionally stable and behaviorally in control. [Return in ____ week(s)] : Return in [unfilled] week(s) [FreeTextEntry1] : 1x/weekly psychotherapy and regular psychiatric treatment

## 2024-02-20 NOTE — PHYSICAL EXAM
[3 - 2  to 3 times a week] : 3 - 2  to 3 times a week [0 - 1 or 2] : 0 - 1 or 2 [0 - Never] : 0 - Never [0 - No] : 0 - No [5] : 5 [4] : 4 [2] : 2 [3] : 3 [1] : 1 [] : No [Individual reports tobacco use during the last 30 days?] : Individual reports tobacco use during the last 30 days? No [FreeTextEntry1] : 3 [SchwartzScore] : 30 [Average] : average [Cooperative] : cooperative [Euthymic] : euthymic [Full] : full [Linear/Goal Directed] : linear/goal directed [Clear] : clear [WNL] : within normal limits

## 2024-02-20 NOTE — DISCUSSION/SUMMARY
[Plan Review] : Plan Review [Able to manage surrounding demands and opportunities] : able to manage surrounding demands and opportunities [Able to set and pursue goals] : able to set and pursue goals [Motivated to participate in treatment] : motivated to participate in treatment [In good health] : in good health [Housing stability] : housing stability [English fluency] : English fluency [Connected to healthcare] : connected to healthcare [Mental Health] : Mental Health [Interpersonal Relationships] : Interpersonal Relationships [Initial] : Initial [___ times a week] : [unfilled] times a week [Improvement in symptoms as evidenced by:] : Improvement in symptoms as evidenced by: [None - Reason others did not participate:] : None - Reason others did not participate:  [Yes] : Yes [Psychiatric Provider/Prescriber] : Psychiatric Provider/Prescriber [FreeTextEntry2] : 8/20/24 [FreeTextEntry8] : Feelings of hopelessness might derail treatment, feelings of fatigue and overwhelm can also lead to avoidance for the patient. [FreeTextEntry9] : Patient is Yazdanism and is a  immigrant, but identifies as American. [every ___ months] : every [unfilled] months [Continued - Progress made] : Continued - Progress made: [FreeTextEntry5] : Patient reports making significant progress in disposing of papers and clothing, but identifies there is still work left to do in this area. [FreeTextEntry1] : Decrease irritability and frustration in the context of marriage [FreeTextEntry4] : "I have a right to enjoy myself the way I want to, and how do I make that clear to him without arguing" [de-identified] : Patient reports feeling criticized by her  and unable to engage in pleasurable activities because of his criticism. [de-identified] : Decrease distress from criticism by 30% [de-identified] : 8/20/24 [de-identified] : Patient reports a 15% increase in her distress about being criticized.  Patient will continue to work on this in psychotherapy. [de-identified] : Patient will engage in pleasurable activities at least once per week for 30 minutes [de-identified] : 8/20/24 [de-identified] : Patient reports that she is now engaging in pleasurable activities weekly, and has not been as inhibited as she anticipated by her fear of being judged.  Patient would like to continue to work on this objective. [de-identified] : Pankaj Dhaliwal MD [de-identified] : Sanjay Solis, PREETR [de-identified] : Patient will report feeling excited to invite people over to her home [de-identified] : not applicable [de-identified] : Pankaj Dhaliwal MD

## 2024-02-20 NOTE — DISCUSSION/SUMMARY
[Plan Review] : Plan Review [Able to manage surrounding demands and opportunities] : able to manage surrounding demands and opportunities [Able to set and pursue goals] : able to set and pursue goals [Motivated to participate in treatment] : motivated to participate in treatment [In good health] : in good health [Housing stability] : housing stability [English fluency] : English fluency [Connected to healthcare] : connected to healthcare [Mental Health] : Mental Health [Interpersonal Relationships] : Interpersonal Relationships [Initial] : Initial [___ times a week] : [unfilled] times a week [Improvement in symptoms as evidenced by:] : Improvement in symptoms as evidenced by: [None - Reason others did not participate:] : None - Reason others did not participate:  [Yes] : Yes [Psychiatric Provider/Prescriber] : Psychiatric Provider/Prescriber [FreeTextEntry2] : 8/20/24 [FreeTextEntry8] : Feelings of hopelessness might derail treatment, feelings of fatigue and overwhelm can also lead to avoidance for the patient. [FreeTextEntry9] : Patient is Gnosticism and is a  immigrant, but identifies as American. [every ___ months] : every [unfilled] months [Continued - Progress made] : Continued - Progress made: [FreeTextEntry5] : Patient reports making significant progress in disposing of papers and clothing, but identifies there is still work left to do in this area. [FreeTextEntry1] : Decrease irritability and frustration in the context of marriage [FreeTextEntry4] : "I have a right to enjoy myself the way I want to, and how do I make that clear to him without arguing" [de-identified] : Patient reports feeling criticized by her  and unable to engage in pleasurable activities because of his criticism. [de-identified] : Decrease distress from criticism by 30% [de-identified] : 8/20/24 [de-identified] : Patient reports a 15% increase in her distress about being criticized.  Patient will continue to work on this in psychotherapy. [de-identified] : Patient will engage in pleasurable activities at least once per week for 30 minutes [de-identified] : 8/20/24 [de-identified] : Patient reports that she is now engaging in pleasurable activities weekly, and has not been as inhibited as she anticipated by her fear of being judged.  Patient would like to continue to work on this objective. [de-identified] : Pankaj Dhaliwal MD [de-identified] : Sanjay Solis, PREETR [de-identified] : Patient will report feeling excited to invite people over to her home [de-identified] : not applicable [de-identified] : Pankaj Dhaliwal MD

## 2024-02-20 NOTE — REASON FOR VISIT
[Patient preference] : as per patient preference [Telehealth (audio & video) - Individual/Group] : This visit was provided via telehealth using real-time 2-way audio visual technology. [Other Location: e.g. Home (Enter Location, City,State)___] : The provider was located at [unfilled]. [Home] : The patient, [unfilled], was located at home, [unfilled], at the time of the visit. [Verbal consent obtained from patient/other participant(s)] : Verbal consent for telehealth/telephonic services obtained from patient/other participant(s) [FreeTextEntry5] : 4:45pm [FreeTextEntry4] : 4:00pm [Patient] : Patient [FreeTextEntry1] : Anxiety and depression related to martial and family stressors

## 2024-02-20 NOTE — RISK ASSESSMENT
[No, patient denies ideation or behavior] : No, patient denies ideation or behavior [No] : No [Yes, more than three months ago] : Yes, more than three months ago [TextBox_32] : Patient denies current SI, plan and intent.  At age 17 patient attempted suicide by taking three or four asprin and disclosed this to her brother immediately after.  No medical attention was needed and there was no psychiatric follow up.  Patient denies any other suicidal gestures or attempts. [FreeTextEntry9] : Patient denies active suicidal ideation, plan or intent and has protective factors of stable housing, close relationships with her children and engagement in healthcare.

## 2024-02-27 ENCOUNTER — APPOINTMENT (OUTPATIENT)
Dept: PSYCHIATRY | Facility: CLINIC | Age: 69
End: 2024-02-27
Payer: MEDICARE

## 2024-02-27 PROCEDURE — 90834 PSYTX W PT 45 MINUTES: CPT | Mod: 95

## 2024-02-27 NOTE — PLAN
[FreeTextEntry2] : Patient receiving psychotherapy to address symptoms of anxiety and depression related to family stressors [Integrative Therapy] : Integrative Therapy  [de-identified] : Patient presented to session on time.  Patient discussed her home repairs, her relationships with her family members and her thoughts about her future.  Patient and this writer explored her thoughts and feelings on these topics and tisha connections to her childhood experiences, her wishes for the future, her health and her home.  This writer offered emotional support and encouragement and session ended with patient emotionally stable and behaviorally in control. [Return in ____ week(s)] : Return in [unfilled] week(s) [FreeTextEntry1] : 1x/weekly psychotherapy and regular psychiatric treatment

## 2024-03-05 ENCOUNTER — APPOINTMENT (OUTPATIENT)
Dept: PSYCHIATRY | Facility: CLINIC | Age: 69
End: 2024-03-05
Payer: MEDICARE

## 2024-03-05 PROCEDURE — 90834 PSYTX W PT 45 MINUTES: CPT | Mod: 95

## 2024-03-05 NOTE — REASON FOR VISIT
[Patient preference] : as per patient preference [Telehealth (audio & video) - Individual/Group] : This visit was provided via telehealth using real-time 2-way audio visual technology. [Other Location: e.g. Home (Enter Location, City,State)___] : The provider was located at [unfilled]. [Home] : The patient, [unfilled], was located at home, [unfilled], at the time of the visit. [Verbal consent obtained from patient/other participant(s)] : Verbal consent for telehealth/telephonic services obtained from patient/other participant(s) [FreeTextEntry4] : 4:00pm [Patient] : Patient [FreeTextEntry5] : 4:45pm [FreeTextEntry1] : Anxiety and depression related to martial and family stressors

## 2024-03-05 NOTE — PLAN
[FreeTextEntry2] : Patient receiving psychotherapy to address symptoms of anxiety and depression related to family stressors [Integrative Therapy] : Integrative Therapy  [de-identified] : Patient presented to session on time.  Patient discussed her marital conflicts around kitchen remodeling.  Patient and this writer explored her thoughts and feelings about her interactions with her  and tisha connections to her feelings about her parents, her hopes of the future and her relationships with her children.  This writer offered emotional support and encouragement and session ended with patient emotionally stable and behaviorally in control. [Return in ____ week(s)] : Return in [unfilled] week(s) [FreeTextEntry1] : 1x/weekly psychotherapy and regular psychiatric treatment

## 2024-03-08 DIAGNOSIS — F41.1 GENERALIZED ANXIETY DISORDER: ICD-10-CM

## 2024-03-12 ENCOUNTER — APPOINTMENT (OUTPATIENT)
Dept: PSYCHIATRY | Facility: CLINIC | Age: 69
End: 2024-03-12
Payer: MEDICARE

## 2024-03-12 PROCEDURE — 90834 PSYTX W PT 45 MINUTES: CPT | Mod: 95

## 2024-03-12 NOTE — PLAN
[FreeTextEntry2] : Patient receiving psychotherapy to address symptoms of anxiety and depression related to family stressors [Integrative Therapy] : Integrative Therapy  [Return in ____ week(s)] : Return in [unfilled] week(s) [de-identified] : Patient presented to session on time.  Patient discussed her marital conflicts around kitchen remodeling.  Patient and this writer explored her thoughts and feelings and helped patient clarify her feelings about being stuck, her externalization of blame and the function of the externalization.  This writer helped patient draw connections to her family dynamics and offered emotional support and encouragement.  Session ended with patient emotionally stable and behaviorally in control. [FreeTextEntry1] : 1x/weekly psychotherapy and regular psychiatric treatment

## 2024-03-12 NOTE — END OF VISIT
[Duration of Psychotherapy Visit (minutes spent in synchronous communication): ____] : Duration of Psychotherapy Visit (minutes spent in synchronous communication): [unfilled] [Individual Psychotherapy for 38-52 minutes] : Individual Psychotherapy for 38 - 52 minutes [Teletherapy Service Provided] : The services provided in this session were delivered via tele-therapy [FreeTextEntry5] : DANNA Ortiz [Licensed Clinician] : Licensed Clinician [FreeTextEntry3] : [place], [place]

## 2024-03-12 NOTE — REASON FOR VISIT
[Patient preference] : as per patient preference [Other Location: e.g. Home (Enter Location, City,State)___] : The provider was located at [unfilled]. [Telehealth (audio & video) - Individual/Group] : This visit was provided via telehealth using real-time 2-way audio visual technology. [Home] : The patient, [unfilled], was located at home, [unfilled], at the time of the visit. [FreeTextEntry4] : 4:00pm [Verbal consent obtained from patient/other participant(s)] : Verbal consent for telehealth/telephonic services obtained from patient/other participant(s) [FreeTextEntry5] : 4:45pm [Patient] : Patient [FreeTextEntry1] : Anxiety and depression related to martial and family stressors

## 2024-03-19 ENCOUNTER — APPOINTMENT (OUTPATIENT)
Dept: PSYCHIATRY | Facility: CLINIC | Age: 69
End: 2024-03-19
Payer: MEDICARE

## 2024-03-19 PROCEDURE — 90834 PSYTX W PT 45 MINUTES: CPT | Mod: 95

## 2024-03-19 NOTE — PLAN
[FreeTextEntry2] : Patient receiving psychotherapy to address symptoms of anxiety and depression related to family stressors [Integrative Therapy] : Integrative Therapy  [de-identified] : Patient presented to session on time.  Patient discussed her frustration with her 's disorganization and difficulty with decision making. Patient and this writer explored her thoughts and feelings about his habits and clarified her feelings about his difficulties.  This writer helped patient identify her own emotional conflict, work through her avoidance of anger and mobilized her feelings in order to reduce depression and enhance motivation.  Session ended with patient emotionally stable and behaviorally in control. [Return in ____ week(s)] : Return in [unfilled] week(s) [FreeTextEntry1] : 1x/weekly psychotherapy and regular psychiatric treatment

## 2024-03-26 ENCOUNTER — APPOINTMENT (OUTPATIENT)
Dept: PSYCHIATRY | Facility: CLINIC | Age: 69
End: 2024-03-26
Payer: MEDICARE

## 2024-03-26 PROCEDURE — 90834 PSYTX W PT 45 MINUTES: CPT | Mod: 95

## 2024-03-26 NOTE — PLAN
[FreeTextEntry2] : Patient receiving psychotherapy to address symptoms of anxiety and depression related to family stressors [Integrative Therapy] : Integrative Therapy  [de-identified] : Patient presented to session on time.  Patient discussed her feelings about the disarray in her home and her inability to address it effectively.  Patient and this writer explored her thoughts and feelings about this topic and tisha connections to her feelings of anger and the defenses she uses to avoid this anger and the unintended problems that are created as a result of the defenses.  This writer offered emotional support and encouragement and session ended with patient emotionally stable and behaviorally in control. [Return in ____ week(s)] : Return in [unfilled] week(s) [FreeTextEntry1] : 1x/weekly psychotherapy and regular psychiatric treatment

## 2024-03-26 NOTE — REASON FOR VISIT
[Patient preference] : as per patient preference [Other Location: e.g. Home (Enter Location, City,State)___] : The provider was located at [unfilled]. [Telehealth (audio & video) - Individual/Group] : This visit was provided via telehealth using real-time 2-way audio visual technology. [Home] : The patient, [unfilled], was located at home, [unfilled], at the time of the visit. [Verbal consent obtained from patient/other participant(s)] : Verbal consent for telehealth/telephonic services obtained from patient/other participant(s) [FreeTextEntry4] : 4:00pm [FreeTextEntry5] : 4:45pm [Patient] : Patient [FreeTextEntry1] : Anxiety and depression related to martial and family stressors

## 2024-03-26 NOTE — PHYSICAL EXAM
[Average] : average [Cooperative] : cooperative [Full] : full [Euthymic] : euthymic [Linear/Goal Directed] : linear/goal directed [Clear] : clear [WNL] : within normal limits

## 2024-03-29 ENCOUNTER — APPOINTMENT (OUTPATIENT)
Dept: PSYCHIATRY | Facility: CLINIC | Age: 69
End: 2024-03-29
Payer: MEDICARE

## 2024-03-29 PROCEDURE — 99214 OFFICE O/P EST MOD 30 MIN: CPT

## 2024-03-29 NOTE — RESULTS/DATA
[FreeTextEntry1] : 10/3/23 labs Abnl - none WNL - CBC, CMP, HA1C, lipids, iron, TSH 2,6 8/16/22 labs sent by pt  ABNL TG  WNL CBC; CMP; lipids; HA1C; TSH 2.69

## 2024-03-29 NOTE — SOCIAL HISTORY
[Lives with Spouse] : lives with spouse [With Family] : lives with family [Unemployed] : unemployed [] :  [College] : College [# Of Children ___] : has [unfilled] children [None] : none [No Known Substance Use] : no known substance use [FreeTextEntry1] : Immigrated to [place] 16 from [place];  28 years; Pre-COVID worked 3 jobs. Reports that the deaths of her mom and her brother in  and  were huge setbacks for her.    Ongoing marital issues with her .   is in tx at [facility] - he has ADD.  They have tried couples tx several times.  His [employer] business closed 8 yrs ago and pandemic was negative. Marriage deteriorated after birth of first child/living next to in-laws.    Dtr 31 has intellectual disability - lives on own but texts pt constantly.  Son is 25 - fibromatosis (has had major surgery) and unemployed. Mother  .  They have a sexual relationship for past several years.

## 2024-03-29 NOTE — HISTORY OF PRESENT ILLNESS
[FreeTextEntry1] : In person at [facility].  Past 3 months:   Pt d/c WB   on approx 1/5/2024 and noted changes after approx 1 month - in 2/2024 pt felt very irritable, "unable to hold back", left eyelid twitching (has been assoc w anxiety in past).  "Has continued in March.  Feels like crying and is down.    "but this maybe due to"  Parents/brothers death anniv March - May.   Dtrs annual budget is re-assessed is always at this time of year.  Still getting together more w friends.  Involved more with [employer] since 9/2023 - "it's fun" and is earning income.  Occas glass wine with dinner.  No cannabis.  Has annual PE  5/2024   [FreeTextEntry2] : h/o sertraline (inefficacious and sexual dysfunction) 2008 - wellbutrin started - helped w depression approx 2013 - escitalopram added to WB  WB  d/c 1/2024 and approx 1 mos later pt became more irritable.

## 2024-03-29 NOTE — DISCUSSION/SUMMARY
[FreeTextEntry1] : Miladys is a 67 year old  domiciled White Female, immigrant from [facility] since [place], retired,  domiciled with  and adult son, with history of depression and anxiety symptoms. Patient has history of requiring up to diazepam 15 tablets/ month in times of higher stress, however has largely been able to manage with 10 pills/ months and continued to encourage to strengthen use coping skills vs Valium as anxiety is largely surrounding psychosocial conflict with her . She reports needing 10mg instead of 5mg of Valium in order to feel the effect lately.  She has hx of disrupted sleep patterns of staying up late at night to watch TV as she does not get a lot of self-care time during the day.  Patient advised of benefits of behavioral activation techniques for self-care and is motivated to increase activity level.  I re-discussed plan to actively reduce diazepam prns and to increase activity and socialization.  Discussed need to limit prns to 5 mg rather than 10 mg and to use self care as a way of limiting need for prns.  Does not drive.  On 1/5/2024 Discussed possible risk of osteoporosis with long term use of  ssris.   Had dexa bone scan 6/27/2022 - dx osteopenia.  Pt wants to continue at this time.  Discussed possibility of  restart WB and pt wants to hold off for now.    defer - Possible addition of other meds for SUKHI. (buspirone, pre-gabalin)  Date of Last Physical Exam: 10/3/2023 - Dr, Frankenburger - cardiologist - [facility]. Date of Last Annual Labs:  10/3/2023  Annual Review of Systems Completed (Y/N): pending Tobacco Screening Completed (Y/N): Quit smoking 2005  PLAN: continue escitalopram  20 mg po daily possible restart Wellbutrin  mg po daily continue OTC Melatonin 5mg PO PRN next wt 1/2025 next annual PE 10/2024 weekly 1:1 w Jose Solis LCSW RT 3 mos

## 2024-03-29 NOTE — PAST MEDICAL HISTORY
[FreeTextEntry1] : Last annual PE august 16 2022 with Dr. Murphy - [place].   Memory has declined - "very slowly" - "like eye sight."  We discussed [facility]  record for opiates rx by dentist: 3/20/23 -  oxycodone 10 mg tabs x 10 10/24/22 - 11/22/22 -  oxycodone 10 mg tabs x 30  8/21/22 - 8/30/22 -  oxycodone 10 mg tabs x 30  Has not been using opiates since April 2023.  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  12 implants were done.  Was using less diazepam due to the opiates.  Did not combine bzd and opiates.  Was aware that risk of resp depression w this combination.  In September bone grafts will be re-evaluated.       10/2023 - Dr, Frankenburger - cardiologist - [facility].

## 2024-04-02 ENCOUNTER — APPOINTMENT (OUTPATIENT)
Dept: PSYCHIATRY | Facility: CLINIC | Age: 69
End: 2024-04-02
Payer: MEDICARE

## 2024-04-02 PROCEDURE — 90834 PSYTX W PT 45 MINUTES: CPT | Mod: 95

## 2024-04-02 NOTE — PLAN
[FreeTextEntry2] : Patient receiving psychotherapy to address symptoms of anxiety and depression related to family stressors [Integrative Therapy] : Integrative Therapy  [de-identified] : Patient presented to session on time.  Patient discussed her 's hoarding behaviors and her thoughts and feelings about this. Patient and this writer explored her thoughts and feelings about the hoarding and the impact it has on her, her internal conflict about expressing her feelings and her associations to significant events in her life.  This writer offered emotional support and encouragement and session ended with patient emotionally stable and behaviorally in control. [Return in ____ week(s)] : Return in [unfilled] week(s) [FreeTextEntry1] : 1x/weekly psychotherapy and regular psychiatric treatment

## 2024-04-02 NOTE — REASON FOR VISIT
[Telehealth (audio & video) - Individual/Group] : This visit was provided via telehealth using real-time 2-way audio visual technology. [Patient preference] : as per patient preference [Other Location: e.g. Home (Enter Location, City,State)___] : The provider was located at [unfilled]. [Home] : The patient, [unfilled], was located at home, [unfilled], at the time of the visit. [Verbal consent obtained from patient/other participant(s)] : Verbal consent for telehealth/telephonic services obtained from patient/other participant(s) [FreeTextEntry4] : 4:00pm [FreeTextEntry5] : 4:45pm [Patient] : Patient [FreeTextEntry1] : Anxiety and depression related to martial and family stressors

## 2024-04-02 NOTE — END OF VISIT
[Individual Psychotherapy for 38-52 minutes] : Individual Psychotherapy for 38 - 52 minutes [Duration of Psychotherapy Visit (minutes spent in synchronous communication): ____] : Duration of Psychotherapy Visit (minutes spent in synchronous communication): [unfilled] [Teletherapy Service Provided] : The services provided in this session were delivered via tele-therapy [FreeTextEntry3] : [place] [FreeTextEntry5] : DANNA Ortiz [Licensed Clinician] : Licensed Clinician

## 2024-04-09 ENCOUNTER — APPOINTMENT (OUTPATIENT)
Dept: PSYCHIATRY | Facility: CLINIC | Age: 69
End: 2024-04-09

## 2024-04-16 ENCOUNTER — APPOINTMENT (OUTPATIENT)
Dept: PSYCHIATRY | Facility: CLINIC | Age: 69
End: 2024-04-16
Payer: MEDICARE

## 2024-04-16 PROCEDURE — 90834 PSYTX W PT 45 MINUTES: CPT | Mod: 95

## 2024-04-16 NOTE — PLAN
[FreeTextEntry2] : Patient receiving psychotherapy to address symptoms of anxiety and depression related to family stressors [Integrative Therapy] : Integrative Therapy  [de-identified] : Patient presented to session on time.  Patient discussed her feeling of being stuck.  Patient and this writer explored her thoughts and feelings about being stuck, worked to discover the origin of this feeling and tisha connections to psychological defenses that are contributing to the problem.  This writer offered emotional support and encouragement while helping patient clarify her feelings.  Session ended with patient emotionally stable and behaviorally in control. [Return in ____ week(s)] : Return in [unfilled] week(s) [FreeTextEntry1] : 1x/weekly psychotherapy and regular psychiatric treatment

## 2024-04-23 ENCOUNTER — APPOINTMENT (OUTPATIENT)
Dept: PSYCHIATRY | Facility: CLINIC | Age: 69
End: 2024-04-23

## 2024-04-30 ENCOUNTER — APPOINTMENT (OUTPATIENT)
Dept: PSYCHIATRY | Facility: CLINIC | Age: 69
End: 2024-04-30
Payer: MEDICARE

## 2024-04-30 PROCEDURE — 90834 PSYTX W PT 45 MINUTES: CPT | Mod: 95

## 2024-04-30 NOTE — PLAN
[FreeTextEntry2] : Patient receiving psychotherapy to address symptoms of anxiety and depression related to family stressors [Integrative Therapy] : Integrative Therapy  [de-identified] : Patient presented to session on time.  Patient discussed her inability to clean her home.  Patient and this writer explored her thoughts and feelings about her home maintenance, her feelings towards her  and her feelings about conflict.  This writer helped patient think of ways to address her stuckness and offered emotional support and encouragement.  Session ended with patient emotionally stable and behaviorally in control. [Return in ____ week(s)] : Return in [unfilled] week(s) [FreeTextEntry1] : 1x/weekly psychotherapy and regular psychiatric treatment

## 2024-04-30 NOTE — REASON FOR VISIT
[Patient preference] : as per patient preference [Telehealth (audio & video) - Individual/Group] : This visit was provided via telehealth using real-time 2-way audio visual technology. [Other Location: e.g. Home (Enter Location, City,State)___] : The provider was located at [unfilled]. [Home] : The patient, [unfilled], was located at home, [unfilled], at the time of the visit. [Verbal consent obtained from patient/other participant(s)] : Verbal consent for telehealth/telephonic services obtained from patient/other participant(s) [FreeTextEntry4] : 3:00pm [FreeTextEntry5] : 3:45pm [Patient] : Patient [FreeTextEntry1] : Anxiety and depression related to martial and family stressors

## 2024-05-07 ENCOUNTER — APPOINTMENT (OUTPATIENT)
Dept: PSYCHIATRY | Facility: CLINIC | Age: 69
End: 2024-05-07
Payer: MEDICARE

## 2024-05-07 PROCEDURE — 90834 PSYTX W PT 45 MINUTES: CPT | Mod: 95

## 2024-05-07 NOTE — PLAN
[FreeTextEntry2] : Patient receiving psychotherapy to address symptoms of anxiety and depression related to family stressors [Integrative Therapy] : Integrative Therapy  [de-identified] : Patient presented to session on time.  Patient discussed her plan to clean her home.  Patient and this writer explored her thoughts and feelings about the problem, how to navigate it with her family's resistance, her internal difficulties with attempting to solve the problem and her worries about her dental health.  This writer offered emotional support and encouragement and helped patient clarify her feelings.  Session ended with patient emotionally stable and behaviorally in control. [Return in ____ week(s)] : Return in [unfilled] week(s) [FreeTextEntry1] : 1x/weekly psychotherapy and regular psychiatric treatment

## 2024-05-14 ENCOUNTER — APPOINTMENT (OUTPATIENT)
Dept: PSYCHIATRY | Facility: CLINIC | Age: 69
End: 2024-05-14
Payer: MEDICARE

## 2024-05-14 PROCEDURE — 90834 PSYTX W PT 45 MINUTES: CPT | Mod: 95

## 2024-05-14 NOTE — PLAN
[FreeTextEntry2] : Patient receiving psychotherapy to address symptoms of anxiety and depression related to family stressors [Integrative Therapy] : Integrative Therapy  [de-identified] : Patient presented to session on time.  Patient discussed her recent attempt to get her  to clean their home. Patient and this writer explored her thoughts and feelings about this attempt, her level of motivation, commitment and importance of making this change and the barriers to success.  Patient reported enhanced motivation and desire to complete this task.  This writer offered emotional support and encouragement and session ended with patient emotionally stable and behaviorally in control. [Return in ____ week(s)] : Return in [unfilled] week(s) [FreeTextEntry1] : 1x/weekly psychotherapy and regular psychiatric treatment

## 2024-05-21 ENCOUNTER — APPOINTMENT (OUTPATIENT)
Dept: PSYCHIATRY | Facility: CLINIC | Age: 69
End: 2024-05-21
Payer: MEDICARE

## 2024-05-21 PROCEDURE — 90834 PSYTX W PT 45 MINUTES: CPT | Mod: 95

## 2024-05-21 NOTE — PLAN
[FreeTextEntry2] : Patient receiving psychotherapy to address symptoms of anxiety and depression related to family stressors [Integrative Therapy] : Integrative Therapy  [de-identified] : Patient presented to session on time.  Patient discussed her feelings of frustration with  and their relationship.  Patient and this writer explored her thoughts and feelings and tisha connections to their pattern of externalizing blame, frustration and emotional avoidance and level of motivation to change.  This writer helped patient clarify her feelings and enhance motivation for couple's therapy.  Session ended with patient emotionally stable and behaviorally in control. [Return in ____ week(s)] : Return in [unfilled] week(s) [FreeTextEntry1] : 1x/weekly psychotherapy and regular psychiatric treatment

## 2024-05-28 ENCOUNTER — APPOINTMENT (OUTPATIENT)
Dept: PSYCHIATRY | Facility: CLINIC | Age: 69
End: 2024-05-28
Payer: MEDICARE

## 2024-05-28 PROCEDURE — 90834 PSYTX W PT 45 MINUTES: CPT | Mod: 95

## 2024-05-28 NOTE — PLAN
[FreeTextEntry2] : Patient receiving psychotherapy to address symptoms of anxiety and depression related to family stressors [Integrative Therapy] : Integrative Therapy  [de-identified] : Patient presented to session on time.  Patient discussed her feelings towards her  and their marriage.  Patient and this writer explored her thoughts and feelings about the marriage and her  and clarified the reasons for not  and for not pursuing couple's therapy.  This writer helped patient clarify her feelings and offered emotional support.  Session ended with patient emotionally stable and behaviorally in control. [Return in ____ week(s)] : Return in [unfilled] week(s) [FreeTextEntry1] : 1x/weekly psychotherapy and regular psychiatric treatment

## 2024-06-01 ENCOUNTER — OUTPATIENT (OUTPATIENT)
Dept: OUTPATIENT SERVICES | Facility: HOSPITAL | Age: 69
LOS: 1 days | Discharge: HOME | End: 2024-06-01

## 2024-06-04 ENCOUNTER — APPOINTMENT (OUTPATIENT)
Dept: PSYCHIATRY | Facility: CLINIC | Age: 69
End: 2024-06-04

## 2024-06-07 DIAGNOSIS — F33.1 MAJOR DEPRESSIVE DISORDER, RECURRENT, MODERATE: ICD-10-CM

## 2024-06-07 DIAGNOSIS — F41.1 GENERALIZED ANXIETY DISORDER: ICD-10-CM

## 2024-06-11 ENCOUNTER — APPOINTMENT (OUTPATIENT)
Dept: PSYCHIATRY | Facility: CLINIC | Age: 69
End: 2024-06-11
Payer: MEDICARE

## 2024-06-11 PROCEDURE — 90834 PSYTX W PT 45 MINUTES: CPT | Mod: 95

## 2024-06-11 NOTE — PLAN
[FreeTextEntry2] : Patient receiving psychotherapy to address symptoms of anxiety and depression related to family stressors [Integrative Therapy] : Integrative Therapy  [de-identified] : Patient presented to session on time.  Patient discussed her children's current goals and challenges in their lives.  Patient and this writer explored her thoughts and feelings about her children, drawing connections to her evolving role as a mother, the children's unique skills and challenges, and their approaches to solving their problems.  This writer offered emotional support and encouragement and session ended with patient emotionally stable and behaviorally in control. [Return in ____ week(s)] : Return in [unfilled] week(s) [FreeTextEntry1] : 1x/weekly psychotherapy and regular psychiatric treatment

## 2024-06-18 ENCOUNTER — APPOINTMENT (OUTPATIENT)
Dept: PSYCHIATRY | Facility: CLINIC | Age: 69
End: 2024-06-18
Payer: MEDICARE

## 2024-06-18 PROCEDURE — 90834 PSYTX W PT 45 MINUTES: CPT | Mod: 95

## 2024-06-18 NOTE — PLAN
[FreeTextEntry2] : Patient receiving psychotherapy to address symptoms of anxiety and depression related to family stressors [Integrative Therapy] : Integrative Therapy  [de-identified] : Patient presented to session on time.  Patient discussed her 's health issues and her children.  Patient and this writer explored her thoughts and feelings about these topics and tisha connections to her former colleagues, her own health concerns and her ambivalence about helping her son.  This writer offered emotional support and encouragement and helped patient clarify her feelings.  Session ended with patient emotionally stable and behaviorally in control. [Return in ____ week(s)] : Return in [unfilled] week(s) [FreeTextEntry1] : 1x/weekly psychotherapy and regular psychiatric treatment

## 2024-06-21 ENCOUNTER — APPOINTMENT (OUTPATIENT)
Dept: PSYCHIATRY | Facility: CLINIC | Age: 69
End: 2024-06-21
Payer: MEDICARE

## 2024-06-21 VITALS — HEIGHT: 64 IN | WEIGHT: 176 LBS | BODY MASS INDEX: 30.05 KG/M2

## 2024-06-21 PROCEDURE — 90833 PSYTX W PT W E/M 30 MIN: CPT

## 2024-06-21 PROCEDURE — 99214 OFFICE O/P EST MOD 30 MIN: CPT

## 2024-06-21 PROCEDURE — G2211 COMPLEX E/M VISIT ADD ON: CPT

## 2024-06-21 RX ORDER — ESCITALOPRAM OXALATE 20 MG/1
20 TABLET ORAL DAILY
Qty: 90 | Refills: 0 | Status: ACTIVE | COMMUNITY
Start: 1900-01-01 | End: 1900-01-01

## 2024-06-21 RX ORDER — BUPROPION HYDROCHLORIDE 150 MG/1
150 TABLET, EXTENDED RELEASE ORAL
Qty: 90 | Refills: 0 | Status: ACTIVE | COMMUNITY
Start: 2024-06-21 | End: 1900-01-01

## 2024-06-21 NOTE — SOCIAL HISTORY
[Lives with Spouse] : lives with spouse [With Family] : lives with family [Unemployed] : unemployed [] :  [# Of Children ___] : has [unfilled] children [College] : College [None] : none [No Known Substance Use] : no known substance use [FreeTextEntry1] : [place] to [place] 16 from [place];  28 years; Pre-COVID worked 3 jobs. Reports that the deaths of her mom and her brother in  and  were huge setbacks for her.    Ongoing marital issues with her .   [facility].  They have tried couples tx several times.  His [employer] business closed 8 yrs ago and pandemic was negative. Marriage deteriorated after birth of first child/living next to in-laws.    Dtr 31 has intellectual disability - lives on own but texts pt constantly.  Son is 25 - fibromatosis (has had major surgery) and unemployed. Mother  .  They have a sexual relationship for past several years.

## 2024-06-21 NOTE — HISTORY OF PRESENT ILLNESS
[FreeTextEntry1] : In person at [facility].  Past 3 months:   Pt d/c WB   on approx 1/5/2024 - feels a decrease in motivation, incr irritability, incr appetite/weight.  No longer has left eye twitching or crying.   Feels like crying and is down.  Has coped with  Parents/brothers death anniv March - May.   Worried about 's kidney disease.  Still getting together more w friends.  Involved more with [employer] since 9/2023 - "a bright spot - enjoying it."  Occas glass wine with dinner.  No cannabis.  Had annual PE  5/2024 - Dr. Avendaño - [facility].  No findings other than MRI of spine w recc for PT.  No changes in meds.   [FreeTextEntry2] : h/o sertraline (inefficacious and sexual dysfunction) 2008 - wellbutrin started - helped w depression approx 2013 - escitalopram added to WB  WB  d/c 1/2024 and approx 1 mos later pt became more irritable.

## 2024-06-21 NOTE — PAST MEDICAL HISTORY
[FreeTextEntry1] : Last annual PE august 16 2022 with Dr. Murphy - [place].   Memory has declined - "very slowly" - "like eye sight."  We discussed [facility]  record for opiates rx by dentist: 3/20/23 -  oxycodone 10 mg tabs x 10 10/24/22 - 11/22/22 -  oxycodone 10 mg tabs x 30  8/21/22 - 8/30/22 -  oxycodone 10 mg tabs x 30  Has not been using opiates since April 2023.  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  12 implants were done.  Was using less diazepam due to the opiates.  Did not combine bzd and opiates.  Was aware that risk of resp depression w this combination.  In September bone grafts will be re-evaluated.       10/2023 - Dr, Frankenburger - cardiologist - [facility].

## 2024-06-21 NOTE — DISCUSSION/SUMMARY
[FreeTextEntry1] : Miladys is a 68 year old  domiciled White Female, immigrant from [place] [facility] since [place], retired,  domiciled with  and adult son, with history of depression and anxiety symptoms. Patient has history of requiring up to diazepam 15 tablets/ month in times of higher stress, however has largely been able to manage with 10 pills/ months and continued to encourage to strengthen use coping skills vs Valium as anxiety is largely surrounding psychosocial conflict with her . She reports needing 10mg instead of 5mg of Valium in order to feel the effect lately.  She has hx of disrupted sleep patterns of staying up late at night to watch TV as she does not get a lot of self-care time during the day.  Patient advised of benefits of behavioral activation techniques for self-care and is motivated to increase activity level.  I re-discussed plan to actively reduce diazepam prns and to increase activity and socialization.  Discussed need to limit prns to 5 mg rather than 10 mg and to use self care as a way of limiting need for prns.  Does not drive.  On 1/5/2024 Discussed possible risk of osteoporosis with long term use of  ssris.   Had dexa bone scan 6/27/2022 - dx osteopenia.  Pt wants to continue at this time.  Discussed restart WB   Psychotherapy provided? Minutes: Supportive counselling? Psychoeducation?  discussed plant based eating to address weight gain Behavioral activation? Other?   defer - Possible addition of other meds for SUKHI. (buspirone, pre-gabalin)  Date of Last Physical Exam: 10/3/2023 - Dr, Frankenburger - cardiologist - [facility]. Date of Last Annual Labs:  10/3/2023  Annual Review of Systems Completed (Y/N): pending Tobacco Screening Completed (Y/N): Quit smoking 2005  PLAN: restart Wellbutrin  mg po daily continue escitalopram  20 mg po daily continue OTC Melatonin 5mg PO PRN next wt 1/2025 next annual PE 5/2024 weekly 1:1 w Jose Solis LCSW RT 3 mos

## 2024-06-25 ENCOUNTER — APPOINTMENT (OUTPATIENT)
Dept: PSYCHIATRY | Facility: CLINIC | Age: 69
End: 2024-06-25
Payer: MEDICARE

## 2024-06-25 PROCEDURE — 90834 PSYTX W PT 45 MINUTES: CPT | Mod: 95

## 2024-07-02 ENCOUNTER — APPOINTMENT (OUTPATIENT)
Dept: PSYCHIATRY | Facility: CLINIC | Age: 69
End: 2024-07-02
Payer: MEDICARE

## 2024-07-02 PROBLEM — F41.1 GENERALIZED ANXIETY DISORDER: Status: ACTIVE | Noted: 2021-01-08

## 2024-07-02 PROBLEM — F33.1 MODERATE EPISODE OF RECURRENT MAJOR DEPRESSIVE DISORDER: Status: ACTIVE | Noted: 2021-01-08

## 2024-07-02 PROCEDURE — 90834 PSYTX W PT 45 MINUTES: CPT | Mod: 95

## 2024-07-09 ENCOUNTER — APPOINTMENT (OUTPATIENT)
Dept: PSYCHIATRY | Facility: CLINIC | Age: 69
End: 2024-07-09
Payer: MEDICARE

## 2024-07-09 PROCEDURE — 90834 PSYTX W PT 45 MINUTES: CPT | Mod: 95

## 2024-07-15 ENCOUNTER — APPOINTMENT (OUTPATIENT)
Dept: PSYCHIATRY | Facility: CLINIC | Age: 69
End: 2024-07-15
Payer: MEDICARE

## 2024-07-15 PROCEDURE — 90834 PSYTX W PT 45 MINUTES: CPT

## 2024-07-16 ENCOUNTER — APPOINTMENT (OUTPATIENT)
Dept: PSYCHIATRY | Facility: CLINIC | Age: 69
End: 2024-07-16

## 2024-07-16 NOTE — RESULTS/DATA
No diagnosis found.    Chronic diastolic heart failure:    Stable. Compensated.  Plan: Continue to monitor for worsening symptoms, lab work as planned      ECHO:  4/24/24: EF 65-70%  9/12/22: EF 65%  2/5/2021: EF 60%  9/25/19: EF 60%  8/29/14: EF 55%    BNP:  1/6/23: 1543  11/15/22: 3420  7/25/22: 2121  6/16/21: 2028 2/5/21: 2377  9/30/20: 2296    Heart Failure Therapies:  The 4 Pillars of Heart Failure Therapies    ACE inhibitors, angiotensin receptor blockers, or ARNI (Entresto):  None    2.   Beta blockers:  ~Continues on Metoprolol Tartrate 25mg BID    3.   Aldosterone blockers:   spironolactone (ALDACTONE) 12.5mg daily    4.   SGLT2 inhibitors:     empagliflozin (JARDIANCE) 10 MG tablet daily  ~started on 6-5-23    2.   Pulmonary hypertension:    -Continue spironolactone 12.5mg daily.  -Off Lisinopril since May 2022.  -ECHO 2/5/21> PASP 53mmHg > improved from 70mmHg in 9/2019  -ECHO 9/25/19> RVSP 70mmHg.      /88 (Site: Left Upper Arm, Position: Sitting, Cuff Size: Medium Adult)   Pulse 61   Ht 1.803 m (5' 11\")   Wt 109.8 kg (242 lb)   SpO2 96%   BMI 33.75 kg/m²     BP Readings from Last 3 Encounters:   07/24/24 128/88   05/15/24 121/73   05/08/24 110/75      Stable. Compensated.  Plan: Continue to monitor for worsening symptoms, lab work as planned      3.   Chronic atrial fibrillation:   HR irregular & controlled.  -Remains on Coumadin therapy. Managed by PCP.  -previously discussed Watchman but bleeding issues.    Stable.  Plan: Continue to monitor for worsening symptoms, lab work as planned    4.   Pure hypercholesterolemia:    Continue Lipitor 10mg daily.  -6/14/21  , HDL 38, LDL 66, TG 79.     Stable. Compensated.  Plan: Continue to monitor for worsening symptoms, lab work as planned      5.   Morbid obesity                 - He has limited ability to exercise due to severe ankle pain.     Wt Readings from Last 3 Encounters:   07/12/24 109.8 kg (242 lb)   05/03/24 117.5 kg (259 lb)  [FreeTextEntry1] : 8/16/22 labs sent by pt  ABNL TG  WNL CBC; CMP; lipids; HA1C; TSH 2.69

## 2024-07-23 ENCOUNTER — APPOINTMENT (OUTPATIENT)
Dept: PSYCHIATRY | Facility: CLINIC | Age: 69
End: 2024-07-23
Payer: MEDICARE

## 2024-07-23 PROCEDURE — 90834 PSYTX W PT 45 MINUTES: CPT | Mod: 95

## 2024-07-24 NOTE — END OF VISIT
[Duration of Psychotherapy Visit (minutes spent in synchronous communication): ____] : Duration of Psychotherapy Visit (minutes spent in synchronous communication): [unfilled] [Individual Psychotherapy for 38-52 minutes] : Individual Psychotherapy for 38 - 52 minutes [Licensed Clinician] : Licensed Clinician [Teletherapy Service Provided] : The services provided in this session were delivered via tele-therapy [FreeTextEntry3] : [place] [FreeTextEntry5] : [place]

## 2024-07-24 NOTE — REASON FOR VISIT
[Continuing, patient seen in-person within last 12 months] : Telehealth services are continuing as patient has been seen in-person within last 12 months. [Telehealth (audio & video) - Individual/Group] : This visit was provided via telehealth using real-time 2-way audio visual technology. [Other Location: e.g. Home (Enter Location, City,State)___] : The provider was located at [unfilled]. [Home] : The patient, [unfilled], was located at home, [unfilled], at the time of the visit. [Verbal consent obtained from patient/other participant(s)] : Verbal consent for telehealth/telephonic services obtained from patient/other participant(s) [Patient] : Patient [FreeTextEntry4] : 3:00pm [FreeTextEntry5] : 3:45pm [FreeTextEntry2] : 7/15/24 [FreeTextEntry1] : Anxiety and depression related to martial and family stressors

## 2024-07-24 NOTE — PLAN
[Integrative Therapy] : Integrative Therapy  [Return in ____ week(s)] : Return in [unfilled] week(s) [FreeTextEntry2] : Patient receiving psychotherapy to address symptoms of anxiety and depression related to family stressors [de-identified] : Patient presented to session on time.  Patient discussed her daughter's recent trip out of state.  Patient and this writer explored her thoughts and feelings about her daughter traveling to this conference without either parent, her process of individuation and gaining independence, and her other family members relative lack of progress.  This writer offered emotional support and encouragement and session ended with patient emotionally stable and behaviorally in control. [FreeTextEntry1] : 1x/weekly psychotherapy and regular psychiatric treatment

## 2024-07-30 ENCOUNTER — APPOINTMENT (OUTPATIENT)
Dept: PSYCHIATRY | Facility: CLINIC | Age: 69
End: 2024-07-30
Payer: MEDICARE

## 2024-07-30 PROCEDURE — 90834 PSYTX W PT 45 MINUTES: CPT | Mod: 95

## 2024-08-04 NOTE — PLAN
[FreeTextEntry2] : Patient receiving psychotherapy to address symptoms of anxiety and depression related to family stressors [Integrative Therapy] : Integrative Therapy  [de-identified] : Patient presented to session on time.  Patient discussed her daughter's future after she and her  have passed away.  Patient and this writer explored her thoughts and feelings about their plans for their daughter, her 's difficulty with planning, and her son's challenges with managing responsibilities.  This writer helped patient clarify her feelings and offered emotional support and encouragement.  Session ended with patient emotionally stable and behaviorally in control. [Return in ____ week(s)] : Return in [unfilled] week(s) [FreeTextEntry1] : 1x/weekly psychotherapy and regular psychiatric treatment

## 2024-08-04 NOTE — REASON FOR VISIT
[Continuing, patient seen in-person within last 12 months] : Telehealth services are continuing as patient has been seen in-person within last 12 months. [Telehealth (audio & video) - Individual/Group] : This visit was provided via telehealth using real-time 2-way audio visual technology. [Other Location: e.g. Home (Enter Location, City,State)___] : The provider was located at [unfilled]. [Home] : The patient, [unfilled], was located at home, [unfilled], at the time of the visit. [Verbal consent obtained from patient/other participant(s)] : Verbal consent for telehealth/telephonic services obtained from patient/other participant(s) [FreeTextEntry4] : 3:00pm [FreeTextEntry5] : 3:45pm [Patient] : Patient [FreeTextEntry2] : 7/15/24 [FreeTextEntry1] : Anxiety and depression related to martial and family stressors

## 2024-08-04 NOTE — END OF VISIT
[Duration of Psychotherapy Visit (minutes spent in synchronous communication): ____] : Duration of Psychotherapy Visit (minutes spent in synchronous communication): [unfilled] [Individual Psychotherapy for 38-52 minutes] : Individual Psychotherapy for 38 - 52 minutes [Teletherapy Service Provided] : The services provided in this session were delivered via tele-therapy [FreeTextEntry3] [place] [FreeTextEntry5] : [place] [Licensed Clinician] : Licensed Clinician

## 2024-08-06 ENCOUNTER — APPOINTMENT (OUTPATIENT)
Dept: PSYCHIATRY | Facility: CLINIC | Age: 69
End: 2024-08-06

## 2024-08-06 PROCEDURE — 90834 PSYTX W PT 45 MINUTES: CPT | Mod: 95

## 2024-08-06 NOTE — PLAN
[FreeTextEntry2] : Patient receiving psychotherapy to address symptoms of anxiety and depression related to family stressors [Integrative Therapy] : Integrative Therapy  [de-identified] : Patient presented to session on time with a stable mood, ready to engage in treatment.  Patient discussed her 's relationship with their daughter.  Patient and this writer explored her thoughts and feelings about their relationship, her concerns about him having a negative impact on her, and how this plays out in their marriage.  This writer helped patient clarify her feelings and see the relationship from different perspectives.  Session ended with patient emotionally stable and behaviorally in control. [Return in ____ week(s)] : Return in [unfilled] week(s) [FreeTextEntry1] : 1x/weekly psychotherapy and regular psychiatric treatment

## 2024-08-06 NOTE — REASON FOR VISIT
[Continuing, patient seen in-person within last 12 months] : Telehealth services are continuing as patient has been seen in-person within last 12 months. [Telehealth (audio & video) - Individual/Group] : This visit was provided via telehealth using real-time 2-way audio visual technology. [Other Location: e.g. Home (Enter Location, City,State)___] : The provider was located at [unfilled]. [Home] : The patient, [unfilled], was located at home, [unfilled], at the time of the visit. [Verbal consent obtained from patient/other participant(s)] : Verbal consent for telehealth/telephonic services obtained from patient/other participant(s) [FreeTextEntry4] : 3:00pm [FreeTextEntry5] : 3:45pm [FreeTextEntry2] : 7/15/24 [Patient] : Patient [FreeTextEntry1] : Anxiety and depression related to martial and family stressors

## 2024-08-13 ENCOUNTER — APPOINTMENT (OUTPATIENT)
Dept: PSYCHIATRY | Facility: CLINIC | Age: 69
End: 2024-08-13
Payer: MEDICARE

## 2024-08-13 DIAGNOSIS — F33.1 MAJOR DEPRESSIVE DISORDER, RECURRENT, MODERATE: ICD-10-CM

## 2024-08-13 DIAGNOSIS — F41.1 GENERALIZED ANXIETY DISORDER: ICD-10-CM

## 2024-08-13 PROCEDURE — 90834 PSYTX W PT 45 MINUTES: CPT | Mod: 95

## 2024-08-13 NOTE — PLAN
[FreeTextEntry2] : Patient receiving psychotherapy to address symptoms of anxiety and depression related to family stressors [Integrative Therapy] : Integrative Therapy  [de-identified] : Patient presented to session on time with a stable mood, ready to engage in treatment.  Patient  and this writer collaborated on a treatmnet plan review for the patient.  See chart for details.  Session ended with patient emotionally stable and behaviorally in control. [Return in ____ week(s)] : Return in [unfilled] week(s) [FreeTextEntry1] : 1x/weekly psychotherapy and regular psychiatric treatment

## 2024-08-13 NOTE — PLAN
[FreeTextEntry2] : Patient receiving psychotherapy to address symptoms of anxiety and depression related to family stressors [Integrative Therapy] : Integrative Therapy  [de-identified] : Patient presented to session on time with a stable mood, ready to engage in treatment.  Patient  and this writer collaborated on a treatmnet plan review for the patient.  See chart for details.  Session ended with patient emotionally stable and behaviorally in control. [Return in ____ week(s)] : Return in [unfilled] week(s) [FreeTextEntry1] : 1x/weekly psychotherapy and regular psychiatric treatment

## 2024-08-19 NOTE — DISCUSSION/SUMMARY
[Plan Review] : Plan Review [Able to manage surrounding demands and opportunities] : able to manage surrounding demands and opportunities [Able to set and pursue goals] : able to set and pursue goals [Motivated to participate in treatment] : motivated to participate in treatment [In good health] : in good health [Housing stability] : housing stability [English fluency] : English fluency [Connected to healthcare] : connected to healthcare [Mental Health] : Mental Health [Interpersonal Relationships] : Interpersonal Relationships [___ times a week] : [unfilled] times a week [Improvement in symptoms as evidenced by:] : Improvement in symptoms as evidenced by: [None - Reason others did not participate:] : None - Reason others did not participate:  [Yes] : Yes [Psychiatric Provider/Prescriber] : Psychiatric Provider/Prescriber [Attained - As evidenced by] : Attained - As evidenced by: [every ___ months] : every [unfilled] months [Continued - Progress made] : Continued - Progress made: [Date of Last Physical Exam: _____] : Date of Last Physical Exam: [unfilled] [Date of Last Annual Labs: _____] : Date of Last Annual Labs: [unfilled] [FreeTextEntry2] : 2/13/25 [FreeTextEntry8] : Feelings of hopelessness might derail treatment, feelings of fatigue and overwhelm can also lead to avoidance for the patient. [FreeTextEntry9] : Patient is Evangelical and is a  immigrant, but identifies as American. [FreeTextEntry5] : Patient reports making significant progress in disposing of papers and clothing, but identifies there is still work left to do in this area. [FreeTextEntry1] : Decrease irritability and frustration in the context of marriage [FreeTextEntry4] : "I have a right to enjoy myself the way I want to, and how do I make that clear to him without arguing" [de-identified] : Patient continues to feel criticized by her  but increasingly willing to engage in pleasurable activities regardless of his feelings about it. [de-identified] : Decrease distress from criticism by 30% [de-identified] : 2/13/25 [de-identified] : Patient reports a 15% decrease in her distress about being criticized by her  and will continue to work toward this objective. [de-identified] : Patient will engage in pleasurable activities at least once per week for 30 minutes [de-identified] : Patient reports that she has been successful in prioritizing pleasurable activities but has not yet made it weekly.  Patient will continue to work on increasing frequency and identifying barriers to this change through therapy. [de-identified] : 2/13/25 [de-identified] : Pankaj Dhaliwal MD [de-identified] : Sanjay Solis, PREETR [de-identified] : Patient will report feeling excited to invite people over to her home [de-identified] : not applicable [de-identified] : Pankaj Dhaliwal MD

## 2024-08-19 NOTE — PHYSICAL EXAM
[1 - Monthly or less] : 1 - Monthly or less [0 - 1 or 2] : 0 - 1 or 2 [0 - Never] : 0 - Never [0 - No] : 0 - No [5] : 5 [1] : 1 [4] : 4 [3] : 3 [2] : 2 [Average] : average [Cooperative] : cooperative [Euthymic] : euthymic [Full] : full [Clear] : clear [Linear/Goal Directed] : linear/goal directed [WNL] : within normal limits [] : No [Individual reports tobacco use during the last 30 days?] : Individual reports tobacco use during the last 30 days? No [FreeTextEntry1] : 1 [SchwartzScore] : 29

## 2024-08-19 NOTE — DISCUSSION/SUMMARY
[Plan Review] : Plan Review [Able to manage surrounding demands and opportunities] : able to manage surrounding demands and opportunities [Able to set and pursue goals] : able to set and pursue goals [Motivated to participate in treatment] : motivated to participate in treatment [In good health] : in good health [Housing stability] : housing stability [English fluency] : English fluency [Connected to healthcare] : connected to healthcare [Mental Health] : Mental Health [Interpersonal Relationships] : Interpersonal Relationships [___ times a week] : [unfilled] times a week [Improvement in symptoms as evidenced by:] : Improvement in symptoms as evidenced by: [None - Reason others did not participate:] : None - Reason others did not participate:  [Yes] : Yes [Psychiatric Provider/Prescriber] : Psychiatric Provider/Prescriber [Attained - As evidenced by] : Attained - As evidenced by: [every ___ months] : every [unfilled] months [Continued - Progress made] : Continued - Progress made: [Date of Last Physical Exam: _____] : Date of Last Physical Exam: [unfilled] [Date of Last Annual Labs: _____] : Date of Last Annual Labs: [unfilled] [FreeTextEntry2] : 2/13/25 [FreeTextEntry8] : Feelings of hopelessness might derail treatment, feelings of fatigue and overwhelm can also lead to avoidance for the patient. [FreeTextEntry9] : Patient is Congregation and is a  immigrant, but identifies as American. [FreeTextEntry5] : Patient reports making significant progress in disposing of papers and clothing, but identifies there is still work left to do in this area. [FreeTextEntry1] : Decrease irritability and frustration in the context of marriage [FreeTextEntry4] : "I have a right to enjoy myself the way I want to, and how do I make that clear to him without arguing" [de-identified] : Patient continues to feel criticized by her  but increasingly willing to engage in pleasurable activities regardless of his feelings about it. [de-identified] : Decrease distress from criticism by 30% [de-identified] : 2/13/25 [de-identified] : Patient reports a 15% decrease in her distress about being criticized by her  and will continue to work toward this objective. [de-identified] : Patient will engage in pleasurable activities at least once per week for 30 minutes [de-identified] : Patient reports that she has been successful in prioritizing pleasurable activities but has not yet made it weekly.  Patient will continue to work on increasing frequency and identifying barriers to this change through therapy. [de-identified] : 2/13/25 [de-identified] : Pankaj Dhaliwal MD [de-identified] : Sanjay Solis, PREETR [de-identified] : Patient will report feeling excited to invite people over to her home [de-identified] : not applicable [de-identified] : Pankaj Dhaliwal MD

## 2024-08-19 NOTE — DISCUSSION/SUMMARY
[Plan Review] : Plan Review [Able to manage surrounding demands and opportunities] : able to manage surrounding demands and opportunities [Able to set and pursue goals] : able to set and pursue goals [Motivated to participate in treatment] : motivated to participate in treatment [In good health] : in good health [Housing stability] : housing stability [English fluency] : English fluency [Connected to healthcare] : connected to healthcare [Mental Health] : Mental Health [Interpersonal Relationships] : Interpersonal Relationships [___ times a week] : [unfilled] times a week [Improvement in symptoms as evidenced by:] : Improvement in symptoms as evidenced by: [None - Reason others did not participate:] : None - Reason others did not participate:  [Yes] : Yes [Psychiatric Provider/Prescriber] : Psychiatric Provider/Prescriber [Attained - As evidenced by] : Attained - As evidenced by: [every ___ months] : every [unfilled] months [Continued - Progress made] : Continued - Progress made: [Date of Last Physical Exam: _____] : Date of Last Physical Exam: [unfilled] [Date of Last Annual Labs: _____] : Date of Last Annual Labs: [unfilled] [FreeTextEntry2] : 2/13/25 [FreeTextEntry8] : Feelings of hopelessness might derail treatment, feelings of fatigue and overwhelm can also lead to avoidance for the patient. [FreeTextEntry9] : Patient is Voodoo and is a  immigrant, but identifies as American. [FreeTextEntry5] : Patient reports making significant progress in disposing of papers and clothing, but identifies there is still work left to do in this area. [FreeTextEntry1] : Decrease irritability and frustration in the context of marriage [FreeTextEntry4] : "I have a right to enjoy myself the way I want to, and how do I make that clear to him without arguing" [de-identified] : Patient continues to feel criticized by her  but increasingly willing to engage in pleasurable activities regardless of his feelings about it. [de-identified] : Decrease distress from criticism by 30% [de-identified] : 2/13/25 [de-identified] : Patient reports a 15% decrease in her distress about being criticized by her  and will continue to work toward this objective. [de-identified] : Patient will engage in pleasurable activities at least once per week for 30 minutes [de-identified] : Patient reports that she has been successful in prioritizing pleasurable activities but has not yet made it weekly.  Patient will continue to work on increasing frequency and identifying barriers to this change through therapy. [de-identified] : 2/13/25 [de-identified] : Pankaj Dhaliwal MD [de-identified] : Sanjay Solis, PREETR [de-identified] : Patient will report feeling excited to invite people over to her home [de-identified] : not applicable [de-identified] : Pankaj Dhaliwal MD

## 2024-08-20 ENCOUNTER — APPOINTMENT (OUTPATIENT)
Dept: PSYCHIATRY | Facility: CLINIC | Age: 69
End: 2024-08-20

## 2024-08-27 ENCOUNTER — APPOINTMENT (OUTPATIENT)
Dept: PSYCHIATRY | Facility: CLINIC | Age: 69
End: 2024-08-27

## 2024-09-03 ENCOUNTER — APPOINTMENT (OUTPATIENT)
Dept: PSYCHIATRY | Facility: CLINIC | Age: 69
End: 2024-09-03
Payer: MEDICARE

## 2024-09-03 PROCEDURE — 90834 PSYTX W PT 45 MINUTES: CPT | Mod: 95

## 2024-09-03 NOTE — PLAN
[FreeTextEntry2] : Patient receiving psychotherapy to address symptoms of anxiety and depression related to family stressors [Integrative Therapy] : Integrative Therapy  [de-identified] : Patient presented to session on time with a stable mood, ready to engage in treatment.  Patient discussed her vacation and her feelings about her home environment.  Patient and this writer explored her thoughts and feelings about her "hoarding" and the difficulty she has in finding motivation to clean.  This writer helped patient clarify her feelings about the apartment, how her psychological defenses prevent her from making the change she wants to make and enhance motivation to change.  Session ended with patient emotionally stable and behaviorally in control. [Return in ____ week(s)] : Return in [unfilled] week(s) [FreeTextEntry1] : 1x/weekly psychotherapy and regular psychiatric treatment

## 2024-09-10 ENCOUNTER — APPOINTMENT (OUTPATIENT)
Dept: PSYCHIATRY | Facility: CLINIC | Age: 69
End: 2024-09-10
Payer: MEDICARE

## 2024-09-10 PROCEDURE — 90834 PSYTX W PT 45 MINUTES: CPT | Mod: 95

## 2024-09-11 NOTE — END OF VISIT
Physical Therapy      SUBJECTIVE  Patient with L1 compression fx. Pending MRI per pain management in order to make further recommendations. Will wait for MRI and further recommendations from pain management prior to Physical Therapy eval.      OBJECTIVE                      Documented in the chart in the following areas: Assessment/Plan.        Therapy procedure time and total treatment time can be found documented on the Time Entry flowsheet   [Duration of Psychotherapy Visit (minutes spent in synchronous communication): ____] : Duration of Psychotherapy Visit (minutes spent in synchronous communication): [unfilled] [Individual Psychotherapy for 38-52 minutes] : Individual Psychotherapy for 38 - 52 minutes [Teletherapy Service Provided] : The services provided in this session were delivered via tele-therapy [FreeTextEntry3] [place] [FreeTextEntry5] : [place] [Licensed Clinician] : Licensed Clinician

## 2024-09-11 NOTE — PLAN
[FreeTextEntry2] : Patient receiving psychotherapy to address symptoms of anxiety and depression related to family stressors [Integrative Therapy] : Integrative Therapy  [de-identified] : Patient presented to session on time with a stable mood, ready to engage in treatment.  Patient discussed her daughter's recent demonstrations of increased independence.  Patient and this writer explored her thoughts and feelings about these changes, her feelings about her  and son, and her memories of childhood.  This writer offered emotional support and encouragement.  Session ended with patient emotionally stable and behaviorally in control. [Return in ____ week(s)] : Return in [unfilled] week(s) [FreeTextEntry1] : 1x/weekly psychotherapy and regular psychiatric treatment

## 2024-09-13 ENCOUNTER — APPOINTMENT (OUTPATIENT)
Dept: PSYCHIATRY | Facility: CLINIC | Age: 69
End: 2024-09-13
Payer: MEDICARE

## 2024-09-13 PROBLEM — F33.41 RECURRENT MAJOR DEPRESSIVE DISORDER, IN PARTIAL REMISSION: Status: ACTIVE | Noted: 2024-09-13

## 2024-09-13 PROCEDURE — G2211 COMPLEX E/M VISIT ADD ON: CPT

## 2024-09-13 PROCEDURE — 99214 OFFICE O/P EST MOD 30 MIN: CPT

## 2024-09-13 NOTE — DISCUSSION/SUMMARY
[FreeTextEntry1] : Miladys is a 68 year old  domiciled White Female, [place], retired,  domiciled with  and adult son, with history of depression and anxiety symptoms. Patient has history of requiring up to diazepam 15 tablets/ month in times of higher stress, however has largely been able to manage with 10 pills/ months and continued to encourage to strengthen use coping skills vs Valium as anxiety is largely surrounding psychosocial conflict with her . She reports needing 10mg instead of 5mg of Valium in order to feel the effect lately.  She has hx of disrupted sleep patterns of staying up late at night to watch TV as she does not get a lot of self-care time during the day.  Patient advised of benefits of behavioral activation techniques for self-care and is motivated to increase activity level.  I re-discussed plan to actively reduce diazepam prns and to increase activity and socialization.  Discussed need to limit prns to 5 mg rather than 10 mg and to use self care as a way of limiting need for prns.  Does not drive.  On 1/5/2024 Discussed possible risk of osteoporosis with long term use of  ssris.   Had dexa bone scan 6/27/2022 - dx osteopenia.  Pt wants to continue at this time.  Parents/brothers death anniv every year in March - May.   Became depressed and anxious when WB stopped - the sx reversed when restarted WB 6/2024.    screenshots of Labs from 4/17/2024 emailed to me - CBC, CMP, lipids, HA1C, TSH all WNL except for triglycerides.    Psychotherapy provided? Minutes: Supportive counselling? Psychoeducation?  discussed plant based eating to address weight gain Behavioral activation? Other?  defer - Possible addition of other meds for SUKHI. (buspirone, pre-gabalin)  Date of Last Physical Exam: 4/17/2024 - PCP James Avendaño MD - [facility] Date of Last Annual Labs:  4/17/2024  Annual Review of Systems Completed (Y/N): pending Tobacco Screening Completed (Y/N): Quit smoking 2005  PLAN: continue Wellbutrin  mg po daily continue escitalopram  20 mg po daily continue OTC Melatonin 5mg PO PRN next wt 1/2025 next annual PE 5/2025 PCP James Avendaño MD - [facility] cardiologist Oliver Frankenburger, MD - MD weekly 1:1 w Jose Solis, LCSW RT 3 mos

## 2024-09-13 NOTE — PAST MEDICAL HISTORY
[FreeTextEntry1] : Last annual PE august 16 2022 [place] Dr. [place] - [place]   Memory has declined - "very slowly" - "like eye sight."  We discussed [facility]  record for opiates rx by dentist: 3/20/23 -  oxycodone 10 mg tabs x 10 10/24/22 - 11/22/22 -  oxycodone 10 mg tabs x 30  8/21/22 - 8/30/22 -  oxycodone 10 mg tabs x 30  Has not been using opiates since April 2023.  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  12 implants were done.  Was using less diazepam due to the opiates.  Did not combine bzd and opiates.  Was aware that risk of resp depression w this combination.  In September bone grafts will be re-evaluated.       10/2023 - Dr, Frankenburger - cardiologist - [facility].

## 2024-09-13 NOTE — SOCIAL HISTORY
[Lives with Spouse] : lives with spouse [With Family] : lives with family [Unemployed] : unemployed [] :  [# Of Children ___] : has [unfilled] children [College] : College [None] : none [No Known Substance Use] : no known substance use [FreeTextEntry1] : Immigrated to [place] 16 from [place];  28 years; Pre-COVID worked 3 jobs. Reports that the deaths of her mom and her brother in  and  were huge setbacks for her.    Ongoing marital issues with her .   is in tx at [facility] - he has ADD.  They have tried couples tx several times.  His [employer] business closed 8 yrs ago and pandemic was negative. Marriage deteriorated after birth of first child/living next to in-laws.    Dtr 31 has intellectual disability - lives on own but texts pt constantly.  Son is 25 - fibromatosis (has had major surgery) and unemployed. Mother  .  They have a sexual relationship for past several years.

## 2024-09-13 NOTE — HISTORY OF PRESENT ILLNESS
[FreeTextEntry1] : In person at [facility].  Past 3 months:    restarted Wellbutrin  mg po daily 6/2024 with "marked improvement."    's kidney disease is not as bad as thought.  Still getting together more w friends.  Marriage remains difficult but pt feels less disturbed by their conflicts.  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 Occas glass wine with dinner.  No cannabis.  Had annual PE  5/2024 - Dr. Kateryna UGARTE.  No findings other than MRI of spine w recc for PT.  No changes in meds.   [FreeTextEntry2] : h/o sertraline (inefficacious and sexual dysfunction) 2008 - wellbutrin started - helped w depression approx 2013 - escitalopram added to WB  WB  d/c 1/2024 and approx 1 mos later pt became more irritable.  restarted Wellbutrin  mg po daily 6/2024 with good response.

## 2024-09-17 ENCOUNTER — APPOINTMENT (OUTPATIENT)
Dept: PSYCHIATRY | Facility: CLINIC | Age: 69
End: 2024-09-17
Payer: MEDICARE

## 2024-09-17 DIAGNOSIS — F41.1 GENERALIZED ANXIETY DISORDER: ICD-10-CM

## 2024-09-17 DIAGNOSIS — F33.41 MAJOR DEPRESSIVE DISORDER, RECURRENT, IN PARTIAL REMISSION: ICD-10-CM

## 2024-09-17 DIAGNOSIS — F33.1 MAJOR DEPRESSIVE DISORDER, RECURRENT, MODERATE: ICD-10-CM

## 2024-09-17 PROCEDURE — 90834 PSYTX W PT 45 MINUTES: CPT | Mod: 95

## 2024-09-18 PROBLEM — F33.1 MODERATE EPISODE OF RECURRENT MAJOR DEPRESSIVE DISORDER: Status: RESOLVED | Noted: 2021-01-08 | Resolved: 2024-09-18

## 2024-09-18 NOTE — PLAN
[FreeTextEntry2] : Patient receiving psychotherapy to address symptoms of anxiety and depression related to family stressors [Integrative Therapy] : Integrative Therapy  [de-identified] : Patient presented to session on time with a stable mood, ready to engage in treatment.  Patient discussed her attempts to clean her home and her thoughts about her daughter's future.  Patient and this writer explored her thoughts and feelings and tisha connections to childhood experiences and marital dynamics.  This writer offered emotional support and encouragement.  Session ended with patient emotionally stable and behaviorally in control. [Return in ____ week(s)] : Return in [unfilled] week(s) [FreeTextEntry1] : 1x/weekly psychotherapy and regular psychiatric treatment

## 2024-09-24 ENCOUNTER — APPOINTMENT (OUTPATIENT)
Dept: PSYCHIATRY | Facility: CLINIC | Age: 69
End: 2024-09-24
Payer: MEDICARE

## 2024-09-24 DIAGNOSIS — F41.1 GENERALIZED ANXIETY DISORDER: ICD-10-CM

## 2024-09-24 DIAGNOSIS — F33.41 MAJOR DEPRESSIVE DISORDER, RECURRENT, IN PARTIAL REMISSION: ICD-10-CM

## 2024-09-24 PROCEDURE — 90834 PSYTX W PT 45 MINUTES: CPT | Mod: 95

## 2024-09-30 NOTE — PLAN
[FreeTextEntry2] : Patient receiving psychotherapy to address symptoms of anxiety and depression related to family stressors [Integrative Therapy] : Integrative Therapy  [de-identified] : Patient presented to session on time with a stable mood, ready to engage in treatment.  Patient discussed her daughter's future and the difficulty with planning for care after her death.  Patient and this writer explored her thoughts and feelings about this topic, drawing connections to her relationship with her , her mother and her childhood.  This writer offered emotional support and validation.  Session ended with patient emotionally stable and behaviorally in control. [Return in ____ week(s)] : Return in [unfilled] week(s) [FreeTextEntry1] : 1x/weekly psychotherapy and regular psychiatric treatment

## 2024-10-15 ENCOUNTER — APPOINTMENT (OUTPATIENT)
Dept: PSYCHIATRY | Facility: CLINIC | Age: 69
End: 2024-10-15
Payer: MEDICARE

## 2024-10-15 PROCEDURE — 90834 PSYTX W PT 45 MINUTES: CPT | Mod: 95

## 2024-10-22 ENCOUNTER — APPOINTMENT (OUTPATIENT)
Dept: PSYCHIATRY | Facility: CLINIC | Age: 69
End: 2024-10-22
Payer: MEDICARE

## 2024-10-22 PROCEDURE — 90834 PSYTX W PT 45 MINUTES: CPT | Mod: 95

## 2024-10-29 ENCOUNTER — APPOINTMENT (OUTPATIENT)
Dept: PSYCHIATRY | Facility: CLINIC | Age: 69
End: 2024-10-29
Payer: MEDICARE

## 2024-10-29 PROCEDURE — 90834 PSYTX W PT 45 MINUTES: CPT | Mod: 95

## 2024-11-05 ENCOUNTER — APPOINTMENT (OUTPATIENT)
Dept: PSYCHIATRY | Facility: CLINIC | Age: 69
End: 2024-11-05
Payer: MEDICARE

## 2024-11-05 PROCEDURE — 90834 PSYTX W PT 45 MINUTES: CPT | Mod: 95

## 2024-11-12 ENCOUNTER — APPOINTMENT (OUTPATIENT)
Dept: PSYCHIATRY | Facility: CLINIC | Age: 69
End: 2024-11-12
Payer: MEDICARE

## 2024-11-12 PROCEDURE — 90834 PSYTX W PT 45 MINUTES: CPT | Mod: 95

## 2024-11-19 ENCOUNTER — APPOINTMENT (OUTPATIENT)
Dept: PSYCHIATRY | Facility: CLINIC | Age: 69
End: 2024-11-19
Payer: MEDICARE

## 2024-11-19 DIAGNOSIS — F33.41 MAJOR DEPRESSIVE DISORDER, RECURRENT, IN PARTIAL REMISSION: ICD-10-CM

## 2024-11-19 DIAGNOSIS — F41.1 GENERALIZED ANXIETY DISORDER: ICD-10-CM

## 2024-11-19 PROCEDURE — 90834 PSYTX W PT 45 MINUTES: CPT | Mod: 95

## 2024-11-26 ENCOUNTER — APPOINTMENT (OUTPATIENT)
Dept: PSYCHIATRY | Facility: CLINIC | Age: 69
End: 2024-11-26

## 2024-12-03 ENCOUNTER — APPOINTMENT (OUTPATIENT)
Dept: PSYCHIATRY | Facility: CLINIC | Age: 69
End: 2024-12-03
Payer: MEDICARE

## 2024-12-03 DIAGNOSIS — F41.1 GENERALIZED ANXIETY DISORDER: ICD-10-CM

## 2024-12-03 DIAGNOSIS — F33.41 MAJOR DEPRESSIVE DISORDER, RECURRENT, IN PARTIAL REMISSION: ICD-10-CM

## 2024-12-03 PROCEDURE — 90834 PSYTX W PT 45 MINUTES: CPT | Mod: 95

## 2024-12-05 ENCOUNTER — NON-APPOINTMENT (OUTPATIENT)
Age: 69
End: 2024-12-05

## 2024-12-06 ENCOUNTER — APPOINTMENT (OUTPATIENT)
Dept: PSYCHIATRY | Facility: CLINIC | Age: 69
End: 2024-12-06

## 2024-12-10 ENCOUNTER — APPOINTMENT (OUTPATIENT)
Dept: PSYCHIATRY | Facility: CLINIC | Age: 69
End: 2024-12-10

## 2024-12-17 ENCOUNTER — APPOINTMENT (OUTPATIENT)
Dept: PSYCHIATRY | Facility: CLINIC | Age: 69
End: 2024-12-17
Payer: MEDICARE

## 2024-12-17 DIAGNOSIS — F41.1 GENERALIZED ANXIETY DISORDER: ICD-10-CM

## 2024-12-17 DIAGNOSIS — F33.41 MAJOR DEPRESSIVE DISORDER, RECURRENT, IN PARTIAL REMISSION: ICD-10-CM

## 2024-12-17 PROCEDURE — 90834 PSYTX W PT 45 MINUTES: CPT | Mod: 95

## 2024-12-24 ENCOUNTER — APPOINTMENT (OUTPATIENT)
Dept: PSYCHIATRY | Facility: CLINIC | Age: 69
End: 2024-12-24

## 2024-12-31 ENCOUNTER — APPOINTMENT (OUTPATIENT)
Dept: PSYCHIATRY | Facility: CLINIC | Age: 69
End: 2024-12-31

## 2025-01-07 ENCOUNTER — APPOINTMENT (OUTPATIENT)
Dept: PSYCHIATRY | Facility: CLINIC | Age: 70
End: 2025-01-07
Payer: MEDICARE

## 2025-01-07 PROCEDURE — 90834 PSYTX W PT 45 MINUTES: CPT | Mod: 95

## 2025-01-14 ENCOUNTER — APPOINTMENT (OUTPATIENT)
Dept: PSYCHIATRY | Facility: CLINIC | Age: 70
End: 2025-01-14
Payer: MEDICARE

## 2025-01-14 DIAGNOSIS — F41.1 GENERALIZED ANXIETY DISORDER: ICD-10-CM

## 2025-01-14 DIAGNOSIS — F33.41 MAJOR DEPRESSIVE DISORDER, RECURRENT, IN PARTIAL REMISSION: ICD-10-CM

## 2025-01-14 PROCEDURE — 90834 PSYTX W PT 45 MINUTES: CPT | Mod: 95

## 2025-01-21 ENCOUNTER — APPOINTMENT (OUTPATIENT)
Dept: PSYCHIATRY | Facility: CLINIC | Age: 70
End: 2025-01-21

## 2025-01-21 PROCEDURE — 90834 PSYTX W PT 45 MINUTES: CPT | Mod: 95

## 2025-01-28 ENCOUNTER — APPOINTMENT (OUTPATIENT)
Dept: PSYCHIATRY | Facility: CLINIC | Age: 70
End: 2025-01-28

## 2025-01-29 ENCOUNTER — APPOINTMENT (OUTPATIENT)
Dept: PSYCHIATRY | Facility: CLINIC | Age: 70
End: 2025-01-29
Payer: MEDICARE

## 2025-01-29 PROCEDURE — 99214 OFFICE O/P EST MOD 30 MIN: CPT | Mod: 95

## 2025-02-04 ENCOUNTER — APPOINTMENT (OUTPATIENT)
Dept: PSYCHIATRY | Facility: CLINIC | Age: 70
End: 2025-02-04

## 2025-02-11 ENCOUNTER — APPOINTMENT (OUTPATIENT)
Dept: PSYCHIATRY | Facility: CLINIC | Age: 70
End: 2025-02-11
Payer: MEDICARE

## 2025-02-11 PROCEDURE — 90834 PSYTX W PT 45 MINUTES: CPT | Mod: 95

## 2025-02-13 NOTE — END OF VISIT
Addended by: JS SOLORIO on: 2/13/2025 03:24 PM     Modules accepted: Orders     [Duration of Psychotherapy Visit (minutes spent in synchronous communication): ____] : Duration of Psychotherapy Visit (minutes spent in synchronous communication): [unfilled] [Individual Psychotherapy for 38-52 minutes] : Individual Psychotherapy for 38 - 52 minutes [Teletherapy Service Provided] : The services provided in this session were delivered via tele-therapy [FreeTextEntry3] : [place], [place] [FreeTextEntry5] : DANNA Ortiz [Licensed Clinician] : Licensed Clinician

## 2025-02-18 ENCOUNTER — APPOINTMENT (OUTPATIENT)
Dept: PSYCHIATRY | Facility: CLINIC | Age: 70
End: 2025-02-18
Payer: MEDICARE

## 2025-02-18 DIAGNOSIS — F33.41 MAJOR DEPRESSIVE DISORDER, RECURRENT, IN PARTIAL REMISSION: ICD-10-CM

## 2025-02-18 DIAGNOSIS — F41.1 GENERALIZED ANXIETY DISORDER: ICD-10-CM

## 2025-02-18 PROCEDURE — 90834 PSYTX W PT 45 MINUTES: CPT | Mod: 95

## 2025-02-25 ENCOUNTER — APPOINTMENT (OUTPATIENT)
Dept: PSYCHIATRY | Facility: CLINIC | Age: 70
End: 2025-02-25

## 2025-03-04 ENCOUNTER — APPOINTMENT (OUTPATIENT)
Dept: PSYCHIATRY | Facility: CLINIC | Age: 70
End: 2025-03-04
Payer: MEDICARE

## 2025-03-04 PROCEDURE — 90834 PSYTX W PT 45 MINUTES: CPT | Mod: 95

## 2025-03-11 ENCOUNTER — NON-APPOINTMENT (OUTPATIENT)
Age: 70
End: 2025-03-11

## 2025-03-11 ENCOUNTER — APPOINTMENT (OUTPATIENT)
Dept: PSYCHIATRY | Facility: CLINIC | Age: 70
End: 2025-03-11
Payer: MEDICARE

## 2025-03-11 DIAGNOSIS — F41.1 GENERALIZED ANXIETY DISORDER: ICD-10-CM

## 2025-03-11 DIAGNOSIS — F33.41 MAJOR DEPRESSIVE DISORDER, RECURRENT, IN PARTIAL REMISSION: ICD-10-CM

## 2025-03-11 PROCEDURE — 90834 PSYTX W PT 45 MINUTES: CPT | Mod: 95

## 2025-04-18 ENCOUNTER — APPOINTMENT (OUTPATIENT)
Dept: PSYCHIATRY | Facility: CLINIC | Age: 70
End: 2025-04-18
Payer: MEDICARE

## 2025-04-18 VITALS — HEIGHT: 64 IN | WEIGHT: 175 LBS | BODY MASS INDEX: 29.88 KG/M2

## 2025-04-18 DIAGNOSIS — F41.1 GENERALIZED ANXIETY DISORDER: ICD-10-CM

## 2025-04-18 PROCEDURE — 99214 OFFICE O/P EST MOD 30 MIN: CPT

## 2025-04-18 PROCEDURE — G2211 COMPLEX E/M VISIT ADD ON: CPT

## 2025-04-18 RX ORDER — ESCITALOPRAM OXALATE 5 MG/1
5 TABLET ORAL DAILY
Qty: 90 | Refills: 0 | Status: ACTIVE | COMMUNITY
Start: 2025-04-18 | End: 1900-01-01

## 2025-07-18 ENCOUNTER — APPOINTMENT (OUTPATIENT)
Dept: PSYCHIATRY | Facility: CLINIC | Age: 70
End: 2025-07-18

## 2025-07-23 ENCOUNTER — APPOINTMENT (OUTPATIENT)
Dept: PSYCHIATRY | Facility: CLINIC | Age: 70
End: 2025-07-23

## 2025-07-23 ENCOUNTER — APPOINTMENT (OUTPATIENT)
Dept: PSYCHIATRY | Facility: CLINIC | Age: 70
End: 2025-07-23
Payer: MEDICARE

## 2025-07-23 DIAGNOSIS — F41.1 GENERALIZED ANXIETY DISORDER: ICD-10-CM

## 2025-07-23 DIAGNOSIS — M85.80 OTHER SPECIFIED DISORDERS OF BONE DENSITY AND STRUCTURE, UNSPECIFIED SITE: ICD-10-CM

## 2025-07-23 PROCEDURE — G2211 COMPLEX E/M VISIT ADD ON: CPT | Mod: 95

## 2025-07-23 PROCEDURE — 99214 OFFICE O/P EST MOD 30 MIN: CPT | Mod: 95
